# Patient Record
Sex: FEMALE | Race: BLACK OR AFRICAN AMERICAN | NOT HISPANIC OR LATINO | URBAN - METROPOLITAN AREA
[De-identification: names, ages, dates, MRNs, and addresses within clinical notes are randomized per-mention and may not be internally consistent; named-entity substitution may affect disease eponyms.]

---

## 2017-07-11 ENCOUNTER — EMERGENCY (EMERGENCY)
Facility: HOSPITAL | Age: 52
LOS: 1 days | Discharge: HOME | End: 2017-07-11
Admitting: EMERGENCY MEDICINE
Payer: COMMERCIAL

## 2017-07-11 ENCOUNTER — APPOINTMENT (OUTPATIENT)
Dept: UROLOGY | Facility: CLINIC | Age: 52
End: 2017-07-11

## 2017-07-11 VITALS
DIASTOLIC BLOOD PRESSURE: 103 MMHG | TEMPERATURE: 99 F | SYSTOLIC BLOOD PRESSURE: 156 MMHG | HEART RATE: 68 BPM | RESPIRATION RATE: 16 BRPM | OXYGEN SATURATION: 98 %

## 2017-07-11 VITALS
BODY MASS INDEX: 20.74 KG/M2 | DIASTOLIC BLOOD PRESSURE: 100 MMHG | WEIGHT: 126 LBS | RESPIRATION RATE: 17 BRPM | SYSTOLIC BLOOD PRESSURE: 173 MMHG | HEART RATE: 71 BPM | TEMPERATURE: 98.5 F | HEIGHT: 65.5 IN

## 2017-07-11 VITALS
OXYGEN SATURATION: 100 % | RESPIRATION RATE: 15 BRPM | SYSTOLIC BLOOD PRESSURE: 174 MMHG | HEART RATE: 74 BPM | TEMPERATURE: 98 F | DIASTOLIC BLOOD PRESSURE: 99 MMHG

## 2017-07-11 LAB
ALBUMIN SERPL ELPH-MCNC: 4.5 G/DL — SIGNIFICANT CHANGE UP (ref 3.3–5)
ALP SERPL-CCNC: 65 U/L — SIGNIFICANT CHANGE UP (ref 40–120)
ALT FLD-CCNC: 17 U/L — SIGNIFICANT CHANGE UP (ref 4–33)
APPEARANCE UR: CLEAR — SIGNIFICANT CHANGE UP
AST SERPL-CCNC: 20 U/L — SIGNIFICANT CHANGE UP (ref 4–32)
BASOPHILS # BLD AUTO: 0.04 K/UL — SIGNIFICANT CHANGE UP (ref 0–0.2)
BASOPHILS NFR BLD AUTO: 1.1 % — SIGNIFICANT CHANGE UP (ref 0–2)
BILIRUB SERPL-MCNC: 0.6 MG/DL — SIGNIFICANT CHANGE UP (ref 0.2–1.2)
BILIRUB UR-MCNC: NEGATIVE — SIGNIFICANT CHANGE UP
BLOOD UR QL VISUAL: HIGH
BUN SERPL-MCNC: 7 MG/DL — SIGNIFICANT CHANGE UP (ref 7–23)
CALCIUM SERPL-MCNC: 9.5 MG/DL — SIGNIFICANT CHANGE UP (ref 8.4–10.5)
CHLORIDE SERPL-SCNC: 104 MMOL/L — SIGNIFICANT CHANGE UP (ref 98–107)
CO2 SERPL-SCNC: 25 MMOL/L — SIGNIFICANT CHANGE UP (ref 22–31)
COLOR SPEC: YELLOW — SIGNIFICANT CHANGE UP
CREAT SERPL-MCNC: 0.81 MG/DL — SIGNIFICANT CHANGE UP (ref 0.5–1.3)
EOSINOPHIL # BLD AUTO: 0.04 K/UL — SIGNIFICANT CHANGE UP (ref 0–0.5)
EOSINOPHIL NFR BLD AUTO: 1.1 % — SIGNIFICANT CHANGE UP (ref 0–6)
GLUCOSE SERPL-MCNC: 86 MG/DL — SIGNIFICANT CHANGE UP (ref 70–99)
GLUCOSE UR-MCNC: NEGATIVE — SIGNIFICANT CHANGE UP
HCT VFR BLD CALC: 42.9 % — SIGNIFICANT CHANGE UP (ref 34.5–45)
HGB BLD-MCNC: 14.5 G/DL — SIGNIFICANT CHANGE UP (ref 11.5–15.5)
IMM GRANULOCYTES # BLD AUTO: 0 # — SIGNIFICANT CHANGE UP
IMM GRANULOCYTES NFR BLD AUTO: 0 % — SIGNIFICANT CHANGE UP (ref 0–1.5)
KETONES UR-MCNC: 15 — SIGNIFICANT CHANGE UP
LEUKOCYTE ESTERASE UR-ACNC: NEGATIVE — SIGNIFICANT CHANGE UP
LYMPHOCYTES # BLD AUTO: 1.88 K/UL — SIGNIFICANT CHANGE UP (ref 1–3.3)
LYMPHOCYTES # BLD AUTO: 52.4 % — HIGH (ref 13–44)
MCHC RBC-ENTMCNC: 30.5 PG — SIGNIFICANT CHANGE UP (ref 27–34)
MCHC RBC-ENTMCNC: 33.8 % — SIGNIFICANT CHANGE UP (ref 32–36)
MCV RBC AUTO: 90.3 FL — SIGNIFICANT CHANGE UP (ref 80–100)
MONOCYTES # BLD AUTO: 0.57 K/UL — SIGNIFICANT CHANGE UP (ref 0–0.9)
MONOCYTES NFR BLD AUTO: 15.9 % — HIGH (ref 2–14)
MUCOUS THREADS # UR AUTO: SIGNIFICANT CHANGE UP
NEUTROPHILS # BLD AUTO: 1.06 K/UL — LOW (ref 1.8–7.4)
NEUTROPHILS NFR BLD AUTO: 29.5 % — LOW (ref 43–77)
NITRITE UR-MCNC: NEGATIVE — SIGNIFICANT CHANGE UP
NRBC # FLD: 0 — SIGNIFICANT CHANGE UP
PH UR: 6 — SIGNIFICANT CHANGE UP (ref 5–8)
PLATELET # BLD AUTO: 131 K/UL — LOW (ref 150–400)
PMV BLD: 12.6 FL — SIGNIFICANT CHANGE UP (ref 7–13)
POTASSIUM SERPL-MCNC: 3.9 MMOL/L — SIGNIFICANT CHANGE UP (ref 3.5–5.3)
POTASSIUM SERPL-SCNC: 3.9 MMOL/L — SIGNIFICANT CHANGE UP (ref 3.5–5.3)
PROT SERPL-MCNC: 7.6 G/DL — SIGNIFICANT CHANGE UP (ref 6–8.3)
PROT UR-MCNC: NEGATIVE — SIGNIFICANT CHANGE UP
RBC # BLD: 4.75 M/UL — SIGNIFICANT CHANGE UP (ref 3.8–5.2)
RBC # FLD: 13 % — SIGNIFICANT CHANGE UP (ref 10.3–14.5)
RBC CASTS # UR COMP ASSIST: SIGNIFICANT CHANGE UP (ref 0–?)
SODIUM SERPL-SCNC: 144 MMOL/L — SIGNIFICANT CHANGE UP (ref 135–145)
SP GR SPEC: 1.01 — SIGNIFICANT CHANGE UP (ref 1–1.03)
UROBILINOGEN FLD QL: 0.2 E.U. — SIGNIFICANT CHANGE UP (ref 0.2–1)
WBC # BLD: 3.59 K/UL — LOW (ref 3.8–10.5)
WBC # FLD AUTO: 3.59 K/UL — LOW (ref 3.8–10.5)
WBC UR QL: SIGNIFICANT CHANGE UP (ref 0–?)

## 2017-07-11 PROCEDURE — 99285 EMERGENCY DEPT VISIT HI MDM: CPT

## 2017-07-11 NOTE — ED ADULT TRIAGE NOTE - CHIEF COMPLAINT QUOTE
Patient sent in by urologist  for elevated blood pressure. Seen by urologist for hematuria since January and now with possible vaginal spotting.  Denies pain, fevers, chills. PMH- htn

## 2017-07-11 NOTE — ED PROVIDER NOTE - OBJECTIVE STATEMENT
50 y/o F pt with PMhx of HTN and PSHx of , arrives to the ED c/o elevated BP and Hematuria since earlier today and 20 lb. weight loss over the last 3 months. Pt reports that she saw her Urologist (Dr. Hare) earlier today and was advised to come here due to elevated BP levels. Pt states that she was supposed to have a Cat-scan and US done but couldn't due to elevated BP and was advised to come to the ED for further evaluation. Denies HA, dizziness, SOB, CP, abd pain, LE edema, fever, chills, or any other complaints. Daily mdes: Amiloride (15mg) daily. Allergic to PCN. 50 y/o F pt with PMhx of HTN and PSHx of , arrives to the ED c/o elevated BP and Hematuria since earlier today and 20 lb. weight loss over the last 3 months. Pt reports that she saw her Urologist (Dr. Hare) earlier today and was advised to come here due to elevated BP levels. Pt states that she was supposed to have a CT scan and US done but couldn't due to elevated BP and was advised to come to the ED for further evaluation. Denies HA, dizziness, SOB, CP, abd pain, LE edema, fever, chills, or any other complaints. Daily meds: Amiloride (15mg) daily. Allergic to PCN.

## 2017-07-11 NOTE — ED PROVIDER NOTE - CARE PLAN
Principal Discharge DX:	HTN (hypertension)  Instructions for follow-up, activity and diet:	Continue taking enalapril as prescribed. Take norvasc 5mg once a day. Follow up with your PMD within 2 days and Dr. Hare. Return to ED for any worsening symptoms.

## 2017-07-11 NOTE — ED PROVIDER NOTE - MEDICAL DECISION MAKING DETAILS
52 y/o F pt with hematuria, elevated BP, a hx of HTN, sent here by her Urologist (Dr. Hare) due to high BP. Pt is well appearing and in no apparent distress. Check labs, UA, urine culture, and call Dr. Hare. Reassess.

## 2017-07-11 NOTE — ED PROVIDER NOTE - PLAN OF CARE
Continue taking enalapril as prescribed. Take norvasc 5mg once a day. Follow up with your PMD within 2 days and Dr. Hare. Return to ED for any worsening symptoms.

## 2017-07-11 NOTE — ED PROVIDER NOTE - PROGRESS NOTE DETAILS
ANNEL Mathews: spoke with urology resident who spoke with Dr. Hare who stated that sent patient to ED mainly for elevated BP and already has a special outpatient CT ordered for her. Pt's BP in ED is 156/103. ANNEL aMthews: spoke with urology resident who spoke with Dr. Hare who stated that sent patient to ED mainly for elevated BP and already has a "special" outpatient CT ordered for her. Pt's BP in ED is 156/103. Will dc home.

## 2017-07-12 ENCOUNTER — OTHER (OUTPATIENT)
Age: 52
End: 2017-07-12

## 2017-07-12 LAB — CORE LAB FLUID CYTOLOGY: NORMAL

## 2017-07-12 RX ORDER — AMLODIPINE BESYLATE 2.5 MG/1
1 TABLET ORAL
Qty: 30 | Refills: 0
Start: 2017-07-12 | End: 2017-08-11

## 2017-07-13 ENCOUNTER — FORM ENCOUNTER (OUTPATIENT)
Age: 52
End: 2017-07-13

## 2017-07-13 LAB
BACTERIA UR CULT: SIGNIFICANT CHANGE UP
SPECIMEN SOURCE: SIGNIFICANT CHANGE UP

## 2017-07-14 ENCOUNTER — APPOINTMENT (OUTPATIENT)
Dept: CT IMAGING | Facility: IMAGING CENTER | Age: 52
End: 2017-07-14

## 2017-07-14 ENCOUNTER — OUTPATIENT (OUTPATIENT)
Dept: OUTPATIENT SERVICES | Facility: HOSPITAL | Age: 52
LOS: 1 days | End: 2017-07-14
Payer: COMMERCIAL

## 2017-07-14 DIAGNOSIS — R31.0 GROSS HEMATURIA: ICD-10-CM

## 2017-07-14 PROCEDURE — 82565 ASSAY OF CREATININE: CPT

## 2017-07-14 PROCEDURE — 74178 CT ABD&PLV WO CNTR FLWD CNTR: CPT

## 2017-07-21 ENCOUNTER — OUTPATIENT (OUTPATIENT)
Dept: OUTPATIENT SERVICES | Facility: HOSPITAL | Age: 52
LOS: 1 days | End: 2017-07-21
Payer: COMMERCIAL

## 2017-07-21 ENCOUNTER — APPOINTMENT (OUTPATIENT)
Dept: UROLOGY | Facility: CLINIC | Age: 52
End: 2017-07-21

## 2017-07-21 VITALS — SYSTOLIC BLOOD PRESSURE: 163 MMHG | DIASTOLIC BLOOD PRESSURE: 94 MMHG | RESPIRATION RATE: 17 BRPM | HEART RATE: 66 BPM

## 2017-07-21 DIAGNOSIS — R31.0 GROSS HEMATURIA: ICD-10-CM

## 2017-07-21 PROCEDURE — 52000 CYSTOURETHROSCOPY: CPT

## 2017-07-24 DIAGNOSIS — R31.0 GROSS HEMATURIA: ICD-10-CM

## 2017-07-24 DIAGNOSIS — I10 ESSENTIAL (PRIMARY) HYPERTENSION: ICD-10-CM

## 2017-08-11 ENCOUNTER — APPOINTMENT (OUTPATIENT)
Dept: UROLOGY | Facility: CLINIC | Age: 52
End: 2017-08-11

## 2017-11-28 ENCOUNTER — APPOINTMENT (OUTPATIENT)
Dept: GASTROENTEROLOGY | Facility: CLINIC | Age: 52
End: 2017-11-28
Payer: COMMERCIAL

## 2017-11-28 VITALS
HEIGHT: 65.5 IN | TEMPERATURE: 98.8 F | SYSTOLIC BLOOD PRESSURE: 142 MMHG | WEIGHT: 133 LBS | BODY MASS INDEX: 21.89 KG/M2 | DIASTOLIC BLOOD PRESSURE: 86 MMHG

## 2017-11-28 DIAGNOSIS — Z12.11 ENCOUNTER FOR SCREENING FOR MALIGNANT NEOPLASM OF COLON: ICD-10-CM

## 2017-11-28 DIAGNOSIS — Z82.49 FAMILY HISTORY OF ISCHEMIC HEART DISEASE AND OTHER DISEASES OF THE CIRCULATORY SYSTEM: ICD-10-CM

## 2017-11-28 PROCEDURE — 99204 OFFICE O/P NEW MOD 45 MIN: CPT | Mod: 25

## 2017-11-28 PROCEDURE — 36415 COLL VENOUS BLD VENIPUNCTURE: CPT

## 2017-11-28 RX ORDER — FORMALDEHYDE 10 %
10 SOLUTION, NON-ORAL MISCELLANEOUS
Qty: 89 | Refills: 0 | Status: DISCONTINUED | COMMUNITY
Start: 2017-01-21 | End: 2017-11-28

## 2017-11-28 RX ORDER — CLOTRIMAZOLE AND BETAMETHASONE DIPROPIONATE 10; .5 MG/G; MG/G
1-0.05 CREAM TOPICAL
Qty: 45 | Refills: 0 | Status: DISCONTINUED | COMMUNITY
Start: 2017-06-29 | End: 2017-11-28

## 2017-11-29 LAB
APTT BLD: 29.2 SEC
INR PPP: 0.98 RATIO
PT BLD: 11.1 SEC

## 2017-12-05 ENCOUNTER — APPOINTMENT (OUTPATIENT)
Dept: GASTROENTEROLOGY | Facility: AMBULATORY MEDICAL SERVICES | Age: 52
End: 2017-12-05
Payer: COMMERCIAL

## 2017-12-05 PROCEDURE — 45385 COLONOSCOPY W/LESION REMOVAL: CPT

## 2017-12-14 LAB
BASOPHILS # BLD AUTO: 0.03 K/UL
BASOPHILS NFR BLD AUTO: 0.8 %
EOSINOPHIL # BLD AUTO: 0.07 K/UL
EOSINOPHIL NFR BLD AUTO: 2 %
HCT VFR BLD CALC: 42 %
HGB BLD-MCNC: 13.8 G/DL
IMM GRANULOCYTES NFR BLD AUTO: 0 %
LYMPHOCYTES # BLD AUTO: 1.68 K/UL
LYMPHOCYTES NFR BLD AUTO: 47.6 %
MAN DIFF?: NORMAL
MCHC RBC-ENTMCNC: 30.4 PG
MCHC RBC-ENTMCNC: 32.9 GM/DL
MCV RBC AUTO: 92.5 FL
MONOCYTES # BLD AUTO: 0.56 K/UL
MONOCYTES NFR BLD AUTO: 15.9 %
NEUTROPHILS # BLD AUTO: 1.19 K/UL
NEUTROPHILS NFR BLD AUTO: 33.7 %
PLATELET # BLD AUTO: 176 K/UL
RBC # BLD: 4.54 M/UL
RBC # FLD: 13.5 %
WBC # FLD AUTO: 3.53 K/UL

## 2017-12-19 ENCOUNTER — APPOINTMENT (OUTPATIENT)
Dept: GASTROENTEROLOGY | Facility: AMBULATORY MEDICAL SERVICES | Age: 52
End: 2017-12-19

## 2017-12-21 ENCOUNTER — APPOINTMENT (OUTPATIENT)
Dept: GASTROENTEROLOGY | Facility: CLINIC | Age: 52
End: 2017-12-21
Payer: COMMERCIAL

## 2017-12-21 VITALS
DIASTOLIC BLOOD PRESSURE: 92 MMHG | SYSTOLIC BLOOD PRESSURE: 140 MMHG | TEMPERATURE: 99 F | HEIGHT: 65.5 IN | BODY MASS INDEX: 21.73 KG/M2 | WEIGHT: 132 LBS

## 2017-12-21 PROCEDURE — 99213 OFFICE O/P EST LOW 20 MIN: CPT

## 2017-12-21 RX ORDER — SODIUM SULFATE, POTASSIUM SULFATE, MAGNESIUM SULFATE 17.5; 3.13; 1.6 G/ML; G/ML; G/ML
17.5-3.13-1.6 SOLUTION, CONCENTRATE ORAL
Qty: 1 | Refills: 0 | Status: DISCONTINUED | COMMUNITY
Start: 2017-11-28 | End: 2017-12-21

## 2018-05-29 ENCOUNTER — APPOINTMENT (OUTPATIENT)
Dept: GASTROENTEROLOGY | Facility: CLINIC | Age: 53
End: 2018-05-29
Payer: COMMERCIAL

## 2018-05-29 VITALS
HEIGHT: 65.5 IN | SYSTOLIC BLOOD PRESSURE: 120 MMHG | TEMPERATURE: 98.5 F | DIASTOLIC BLOOD PRESSURE: 100 MMHG | WEIGHT: 134 LBS | OXYGEN SATURATION: 95 % | HEART RATE: 68 BPM | BODY MASS INDEX: 22.06 KG/M2

## 2018-05-29 PROCEDURE — 82270 OCCULT BLOOD FECES: CPT

## 2018-05-29 PROCEDURE — 99214 OFFICE O/P EST MOD 30 MIN: CPT | Mod: 25

## 2018-06-07 ENCOUNTER — RESULT REVIEW (OUTPATIENT)
Age: 53
End: 2018-06-07

## 2018-07-10 ENCOUNTER — RESULT REVIEW (OUTPATIENT)
Age: 53
End: 2018-07-10

## 2018-07-16 PROBLEM — I10 ESSENTIAL (PRIMARY) HYPERTENSION: Chronic | Status: ACTIVE | Noted: 2017-07-27

## 2018-07-16 PROBLEM — I10 ESSENTIAL (PRIMARY) HYPERTENSION: Chronic | Status: INACTIVE | Noted: 2017-07-11 | Resolved: 2017-07-27

## 2018-07-19 ENCOUNTER — OUTPATIENT (OUTPATIENT)
Dept: OUTPATIENT SERVICES | Facility: HOSPITAL | Age: 53
LOS: 1 days | End: 2018-07-19
Payer: COMMERCIAL

## 2018-07-19 ENCOUNTER — APPOINTMENT (OUTPATIENT)
Dept: MAMMOGRAPHY | Facility: CLINIC | Age: 53
End: 2018-07-19
Payer: COMMERCIAL

## 2018-07-19 DIAGNOSIS — Z12.31 ENCOUNTER FOR SCREENING MAMMOGRAM FOR MALIGNANT NEOPLASM OF BREAST: ICD-10-CM

## 2018-07-19 PROCEDURE — 77063 BREAST TOMOSYNTHESIS BI: CPT

## 2018-07-19 PROCEDURE — 77067 SCR MAMMO BI INCL CAD: CPT

## 2018-07-19 PROCEDURE — 77063 BREAST TOMOSYNTHESIS BI: CPT | Mod: 26

## 2018-07-19 PROCEDURE — 77067 SCR MAMMO BI INCL CAD: CPT | Mod: 26

## 2018-08-23 ENCOUNTER — RX RENEWAL (OUTPATIENT)
Age: 53
End: 2018-08-23

## 2018-08-28 ENCOUNTER — RX RENEWAL (OUTPATIENT)
Age: 53
End: 2018-08-28

## 2018-09-27 ENCOUNTER — RECORD ABSTRACTING (OUTPATIENT)
Age: 53
End: 2018-09-27

## 2018-09-27 DIAGNOSIS — Z80.3 FAMILY HISTORY OF MALIGNANT NEOPLASM OF BREAST: ICD-10-CM

## 2018-09-27 DIAGNOSIS — Z86.018 PERSONAL HISTORY OF OTHER BENIGN NEOPLASM: ICD-10-CM

## 2018-09-27 DIAGNOSIS — Z82.5 FAMILY HISTORY OF ASTHMA AND OTHER CHRONIC LOWER RESPIRATORY DISEASES: ICD-10-CM

## 2018-09-27 DIAGNOSIS — L60.3 NAIL DYSTROPHY: ICD-10-CM

## 2018-09-27 DIAGNOSIS — Z83.3 FAMILY HISTORY OF DIABETES MELLITUS: ICD-10-CM

## 2018-11-07 ENCOUNTER — APPOINTMENT (OUTPATIENT)
Dept: INTERNAL MEDICINE | Facility: CLINIC | Age: 53
End: 2018-11-07
Payer: COMMERCIAL

## 2018-11-07 ENCOUNTER — NON-APPOINTMENT (OUTPATIENT)
Age: 53
End: 2018-11-07

## 2018-11-07 ENCOUNTER — TRANSCRIPTION ENCOUNTER (OUTPATIENT)
Age: 53
End: 2018-11-07

## 2018-11-07 VITALS
DIASTOLIC BLOOD PRESSURE: 84 MMHG | HEIGHT: 65.5 IN | TEMPERATURE: 98 F | WEIGHT: 137 LBS | SYSTOLIC BLOOD PRESSURE: 132 MMHG | BODY MASS INDEX: 22.55 KG/M2

## 2018-11-07 DIAGNOSIS — Z12.11 ENCOUNTER FOR SCREENING FOR MALIGNANT NEOPLASM OF COLON: ICD-10-CM

## 2018-11-07 DIAGNOSIS — D36.9 BENIGN NEOPLASM, UNSPECIFIED SITE: ICD-10-CM

## 2018-11-07 DIAGNOSIS — Z11.59 ENCOUNTER FOR SCREENING FOR OTHER VIRAL DISEASES: ICD-10-CM

## 2018-11-07 PROCEDURE — 36415 COLL VENOUS BLD VENIPUNCTURE: CPT

## 2018-11-07 PROCEDURE — 99396 PREV VISIT EST AGE 40-64: CPT | Mod: 25

## 2018-11-07 PROCEDURE — 93000 ELECTROCARDIOGRAM COMPLETE: CPT

## 2018-11-07 NOTE — PLAN
[FreeTextEntry1] : Check sugar cholesterol and hepatitis C today.\par Hypertension controlled.\par Return to office in 6 months.

## 2018-11-07 NOTE — HISTORY OF PRESENT ILLNESS
[FreeTextEntry1] : physical [de-identified] : Declines flu shot.  Wants tdap as daughter just had a baby.\par Worries a lot.  \par No exercise but active at work.\par Has asymmetry of left chest wall below breast that her previous doctor imaged.

## 2018-11-07 NOTE — HEALTH RISK ASSESSMENT
[0] : 2) Feeling down, depressed, or hopeless: Not at all (0) [HIV test declined] : HIV test declined [Patient reported mammogram was normal] : Patient reported mammogram was normal [Patient reported PAP Smear was normal] : Patient reported PAP Smear was normal [[id number]] : 0 [MammogramDate] : 07/18 [PapSmearDate] : 07/18

## 2018-11-11 ENCOUNTER — TRANSCRIPTION ENCOUNTER (OUTPATIENT)
Age: 53
End: 2018-11-11

## 2018-11-11 LAB
25(OH)D3 SERPL-MCNC: 20.3 NG/ML
ALBUMIN SERPL ELPH-MCNC: 4.4 G/DL
ALP BLD-CCNC: 82 U/L
ALT SERPL-CCNC: 26 U/L
ANION GAP SERPL CALC-SCNC: 12 MMOL/L
APPEARANCE: CLEAR
AST SERPL-CCNC: 26 U/L
BACTERIA: NEGATIVE
BASOPHILS # BLD AUTO: 0.03 K/UL
BASOPHILS NFR BLD AUTO: 1.1 %
BILIRUB SERPL-MCNC: 0.4 MG/DL
BILIRUBIN URINE: NEGATIVE
BLOOD URINE: NEGATIVE
BUN SERPL-MCNC: 12 MG/DL
CALCIUM SERPL-MCNC: 9.6 MG/DL
CHLORIDE SERPL-SCNC: 105 MMOL/L
CHOLEST SERPL-MCNC: 168 MG/DL
CHOLEST/HDLC SERPL: 1.8 RATIO
CO2 SERPL-SCNC: 25 MMOL/L
COLOR: YELLOW
CREAT SERPL-MCNC: 0.75 MG/DL
EOSINOPHIL # BLD AUTO: 0.07 K/UL
EOSINOPHIL NFR BLD AUTO: 2.5 %
GLUCOSE QUALITATIVE U: NEGATIVE MG/DL
GLUCOSE SERPL-MCNC: 79 MG/DL
HBA1C MFR BLD HPLC: 5.6 %
HCT VFR BLD CALC: 43.7 %
HCV AB SER QL: NONREACTIVE
HCV S/CO RATIO: 0.69 S/CO
HDLC SERPL-MCNC: 94 MG/DL
HGB BLD-MCNC: 14.4 G/DL
HYALINE CASTS: 1 /LPF
IMM GRANULOCYTES NFR BLD AUTO: 0 %
KETONES URINE: NEGATIVE
LDLC SERPL CALC-MCNC: 66 MG/DL
LEUKOCYTE ESTERASE URINE: NEGATIVE
LYMPHOCYTES # BLD AUTO: 1.18 K/UL
LYMPHOCYTES NFR BLD AUTO: 41.7 %
MAN DIFF?: NORMAL
MCHC RBC-ENTMCNC: 30.8 PG
MCHC RBC-ENTMCNC: 33 GM/DL
MCV RBC AUTO: 93.6 FL
MICROSCOPIC-UA: NORMAL
MONOCYTES # BLD AUTO: 0.4 K/UL
MONOCYTES NFR BLD AUTO: 14.1 %
NEUTROPHILS # BLD AUTO: 1.15 K/UL
NEUTROPHILS NFR BLD AUTO: 40.6 %
NITRITE URINE: NEGATIVE
PH URINE: 7
PLATELET # BLD AUTO: 148 K/UL
POTASSIUM SERPL-SCNC: 4.3 MMOL/L
PROT SERPL-MCNC: 7.7 G/DL
PROTEIN URINE: NEGATIVE MG/DL
RBC # BLD: 4.67 M/UL
RBC # FLD: 13.9 %
RED BLOOD CELLS URINE: 3 /HPF
SODIUM SERPL-SCNC: 142 MMOL/L
SPECIFIC GRAVITY URINE: 1.02
SQUAMOUS EPITHELIAL CELLS: 0 /HPF
TRIGL SERPL-MCNC: 41 MG/DL
TSH SERPL-ACNC: 2.06 UIU/ML
UROBILINOGEN URINE: NEGATIVE MG/DL
VIT B12 SERPL-MCNC: 840 PG/ML
WBC # FLD AUTO: 2.83 K/UL
WHITE BLOOD CELLS URINE: 0 /HPF

## 2018-11-29 ENCOUNTER — RESULT REVIEW (OUTPATIENT)
Age: 53
End: 2018-11-29

## 2018-12-13 ENCOUNTER — TRANSCRIPTION ENCOUNTER (OUTPATIENT)
Age: 53
End: 2018-12-13

## 2018-12-13 LAB — HEMOCCULT STL QL IA: NEGATIVE

## 2018-12-27 ENCOUNTER — RESULT REVIEW (OUTPATIENT)
Age: 53
End: 2018-12-27

## 2019-01-23 ENCOUNTER — APPOINTMENT (OUTPATIENT)
Dept: INTERNAL MEDICINE | Facility: CLINIC | Age: 54
End: 2019-01-23
Payer: COMMERCIAL

## 2019-01-23 ENCOUNTER — CHART COPY (OUTPATIENT)
Age: 54
End: 2019-01-23

## 2019-01-23 VITALS
BODY MASS INDEX: 22.55 KG/M2 | TEMPERATURE: 97.9 F | HEIGHT: 65.5 IN | SYSTOLIC BLOOD PRESSURE: 132 MMHG | WEIGHT: 137 LBS | DIASTOLIC BLOOD PRESSURE: 90 MMHG

## 2019-01-23 DIAGNOSIS — G44.53 PRIMARY THUNDERCLAP HEADACHE: ICD-10-CM

## 2019-01-23 PROCEDURE — 99215 OFFICE O/P EST HI 40 MIN: CPT

## 2019-01-23 RX ORDER — MISOPROSTOL 200 UG/1
200 TABLET ORAL
Qty: 1 | Refills: 0 | Status: DISCONTINUED | COMMUNITY
Start: 2018-12-24

## 2019-01-23 RX ORDER — CLARITHROMYCIN 500 MG/1
500 TABLET, FILM COATED ORAL
Qty: 20 | Refills: 0 | Status: DISCONTINUED | COMMUNITY
Start: 2018-12-18

## 2019-01-23 NOTE — HISTORY OF PRESENT ILLNESS
[FreeTextEntry8] : Pt had a HERNANDEZ suddenly today that was worse with bending over.  She describes it as the worst HA of her life. Checked her BP and it was 176/114.  Then it went down to 150 systolic.  Her daughter is a pharmacist and wants her to stop enalapril and start amlodipine.\par Had a sinus infection 1 month ago and went to ENT.  They told her to do nasal spray and gave abx, no steroids.  She still has some congestion symptoms and a dry cough.

## 2019-01-23 NOTE — PLAN
[FreeTextEntry1] : She will go for a stat CT head to rule out an intracranial bleed. Her neurologic exam is normal.\par Her blood pressure is normal today. As per her daughter's request we will stop the enalapril and start amlodipine. I believe 10 mg should be an adequate dose for her. She will return to office in 2 weeks for another blood pressure check.\par She will f/u with ENT if sinus symptoms do not resolve.\par

## 2019-01-23 NOTE — PHYSICAL EXAM
[No Acute Distress] : no acute distress [Normal Sclera/Conjunctiva] : normal sclera/conjunctiva [Normal Outer Ear/Nose] : the outer ears and nose were normal in appearance [Supple] : supple [No Respiratory Distress] : no respiratory distress  [Clear to Auscultation] : lungs were clear to auscultation bilaterally [Normal Rate] : normal rate  [Regular Rhythm] : with a regular rhythm [Normal Posterior Cervical Nodes] : no posterior cervical lymphadenopathy [Normal Anterior Cervical Nodes] : no anterior cervical lymphadenopathy [Grossly Normal Strength/Tone] : grossly normal strength/tone [No Rash] : no rash [Normal Gait] : normal gait [Coordination Grossly Intact] : coordination grossly intact [No Focal Deficits] : no focal deficits [Deep Tendon Reflexes (DTR)] : deep tendon reflexes were 2+ and symmetric [Normal Affect] : the affect was normal [Kyphosis] : no kyphosis [de-identified] : cn 2-12 intact

## 2019-01-31 ENCOUNTER — OTHER (OUTPATIENT)
Age: 54
End: 2019-01-31

## 2019-02-05 ENCOUNTER — APPOINTMENT (OUTPATIENT)
Dept: INTERNAL MEDICINE | Facility: CLINIC | Age: 54
End: 2019-02-05

## 2019-06-27 ENCOUNTER — RESULT REVIEW (OUTPATIENT)
Age: 54
End: 2019-06-27

## 2019-08-27 ENCOUNTER — RX RENEWAL (OUTPATIENT)
Age: 54
End: 2019-08-27

## 2019-10-27 ENCOUNTER — EMERGENCY (EMERGENCY)
Facility: HOSPITAL | Age: 54
LOS: 1 days | Discharge: HOME | End: 2019-10-27
Attending: EMERGENCY MEDICINE | Admitting: EMERGENCY MEDICINE
Payer: COMMERCIAL

## 2019-10-27 VITALS
SYSTOLIC BLOOD PRESSURE: 176 MMHG | OXYGEN SATURATION: 100 % | TEMPERATURE: 98 F | HEART RATE: 84 BPM | RESPIRATION RATE: 16 BRPM | DIASTOLIC BLOOD PRESSURE: 109 MMHG

## 2019-10-27 LAB — OB PNL STL: NEGATIVE — SIGNIFICANT CHANGE UP

## 2019-10-27 PROCEDURE — 99283 EMERGENCY DEPT VISIT LOW MDM: CPT

## 2019-10-27 NOTE — ED PROVIDER NOTE - PHYSICAL EXAMINATION
chaperoned by GAVIN Cam  rectal: no fissure, no external hemorrhoids  no internal hemorrhoids palpated; brown stool; no bleeding; no abscess

## 2019-10-27 NOTE — ED PROVIDER NOTE - NSFOLLOWUPINSTRUCTIONS_ED_ALL_ED_FT
Follow up with your GI doctor as scheduled  Return to ED for rectal bleeding, worsening pain, fever, or any other complaints in which you feel you may require immediate care.

## 2019-10-27 NOTE — ED PROVIDER NOTE - OBJECTIVE STATEMENT
53 yr old female with no sig PMH, last colonoscopy showing polyps and internal hemorrhoids presents with BRBPR.  States was feeling some rectal pain and felt maybe this was hemorrhoids.  Started warm baths, lavendar oil, etc.  Today after having a normal bowel movement went to shower before [employer] and felt a gush of blood come out her rectum.  Applied a pad.  Not actively bleeding.  has GI appt on November 7th.  Denies abdominal pain, fever, urinary symptoms.

## 2019-10-27 NOTE — ED ADULT TRIAGE NOTE - CHIEF COMPLAINT QUOTE
Pt c/o 1 week of rectal pain, has history of hemorrhoids and polyps, used hemorrhoid suppositories and started feeling better. Pt states this morning she had copious BRBPR. Pt denies abd pain, reports continued rectal pain. PMH of HTN, took her meds prior to arrival.

## 2019-10-27 NOTE — ED PROVIDER NOTE - PATIENT PORTAL LINK FT
You can access the FollowMyHealth Patient Portal offered by [facility] by registering at the following website: [email or web address]. By joining [facility]’s FollowMyHealth portal, you will also be able to view your health information using other applications (apps) compatible with our system.

## 2019-10-27 NOTE — ED PROVIDER NOTE - CLINICAL SUMMARY MEDICAL DECISION MAKING FREE TEXT BOX
VSS: brown stool; not actively bleeding; has appropriate follow up; no pain on ADAM; will send hemoccult; will d/c

## 2019-10-28 ENCOUNTER — TRANSCRIPTION ENCOUNTER (OUTPATIENT)
Age: 54
End: 2019-10-28

## 2019-10-31 ENCOUNTER — APPOINTMENT (OUTPATIENT)
Dept: GASTROENTEROLOGY | Facility: CLINIC | Age: 54
End: 2019-10-31
Payer: COMMERCIAL

## 2019-10-31 VITALS
HEART RATE: 72 BPM | DIASTOLIC BLOOD PRESSURE: 90 MMHG | TEMPERATURE: 98.4 F | SYSTOLIC BLOOD PRESSURE: 140 MMHG | OXYGEN SATURATION: 95 %

## 2019-10-31 PROCEDURE — 82270 OCCULT BLOOD FECES: CPT

## 2019-10-31 PROCEDURE — 99214 OFFICE O/P EST MOD 30 MIN: CPT | Mod: 25

## 2019-11-01 NOTE — PHYSICAL EXAM
[General Appearance - Alert] : alert [General Appearance - In No Acute Distress] : in no acute distress [Neck Appearance] : the appearance of the neck was normal [Neck Cervical Mass (___cm)] : no neck mass was observed [Jugular Venous Distention Increased] : there was no jugular-venous distention [Thyroid Diffuse Enlargement] : the thyroid was not enlarged [Thyroid Nodule] : there were no palpable thyroid nodules [Auscultation Breath Sounds / Voice Sounds] : lungs were clear to auscultation bilaterally [Heart Rate And Rhythm] : heart rate was normal and rhythm regular [Heart Sounds] : normal S1 and S2 [Heart Sounds Gallop] : no gallops [Murmurs] : no murmurs [Heart Sounds Pericardial Friction Rub] : no pericardial rub [Edema] : there was no peripheral edema [Bowel Sounds] : normal bowel sounds [Abdomen Soft] : soft [] : no hepato-splenomegaly [Abdomen Mass (___ Cm)] : no abdominal mass palpated [Normal Sphincter Tone] : normal sphincter tone [No Rectal Mass] : no rectal mass [External Hemorrhoid] : external hemorrhoids [No CVA Tenderness] : no ~M costovertebral angle tenderness [No Spinal Tenderness] : no spinal tenderness [Oriented To Time, Place, And Person] : oriented to person, place, and time [Impaired Insight] : insight and judgment were intact [Affect] : the affect was normal [FreeTextEntry1] : mild epigastric tenderness [Occult Blood Positive] : stool was negative for occult blood

## 2019-11-01 NOTE — ASSESSMENT
[FreeTextEntry1] : Patient with rectal bleeding along with rectal pain, burning, and itching.  She is currently guaiac negative and has small external hemorrhoids.  It appears that her symptoms are recurring due to her internal hemorrhoids.  She does have a history of colonic polyps.\par \par Patient was given hydrocortisone suppositories 25 mg to take twice daily for 7 days and then as needed.\par \par Patient will return to see me if she has continued rectal bleeding, in which case we would do a colonoscopy now.  Otherwise, the patient is due for a colonoscopy in December 2020.\par \par \par Plan from 5/29/2018 - Patient with 2 days of self-limited diarrhea with blood one week ago. The bleeding has resolved. The patient is currently guaiac-negative. She has hemorrhoids which likely caused the bleeding.\par \par The patient was given Proctosol HC to Naprosyn cream to use as needed for her hemorrhoids.\par \par The patient was reassured. She was advised to call if any further bleeding develops.\par \par Patient is due for colonoscopy in December 2020 for a history of polyps.\par \par \par Plan from 12/21/17 - Patient with an adenomatous polyp. She has hemorrhoids which are asymptomatic. She has had no further rectal bleeding.\par \par We will repeat a colonoscopy in 3 years this December 2020.\par \par Advised patient to increase fiber and water in her diet to prevent symptomatic hemorrhoids.\par \par \par Plan from 11/28/17 - Patient had one-week of rectal bleeding. This may have been related to her external hemorrhoid but other etiologies need to be considered. Additionally, the patient requires an initial screening colonoscopy. The patient has a history of gross hematuria and admits to easy bruising. A bleeding disorder needs to be considered.\par \par Blood work was sent for CBC, PT, PTT.\par \par Assuming the above labs are normal, a colonoscopy has been scheduled. The risks, benefits, alternatives, and limitations of the procedure, including the possibility of missed lesions, were explained.

## 2019-11-01 NOTE — HISTORY OF PRESENT ILLNESS
[FreeTextEntry1] : The patient reports that 3 weeks ago she held her stool in which made her bowel movements hard and she developed burning/itching at the rectum.  She used an over-the-counter hemorrhoidal suppository and did warm soaks with some relief on Sunday morning while in the shower the patient started having blood coming from her rectum dripping down her leg.  She went to the [facility] emergency room where she was guaiac negative and sent home.  She notes that she had increased frequency of bowel movements but these have since slowed down.  She is now having 2 solid bowel movements a day without melena.  She denies abdominal pain, heartburn, dysphasia.  She continues to complain of rectal pain, burning, and itching, mostly internally.  She has known internal and external hemorrhoids.\par \par \par Note from 5/29/2018 - The patient states that she had a sinus infection with a runny nose. She took prednisone for 2 days and then developed diarrhea. She had 2 days of multiple liquid bowel movements with blood in it, which occurred one week ago. Since then, the patient has had one solid bowel movement a day without any further bleeding. There is no melena. She denies abdominal pain until today which she feels is related to eating spoiled food last evening. She denies rectal pain, burning, or itching. Her last colonoscopy was in December and revealed an adenomatous polyp and internal and external hemorrhoids.\par \par \par Note from 12/21/17 - The patient is status post colonoscopy performed on December 5, 2017. A 1.5 cm polyp was removed from the proximal transverse colon and was a tubular adenoma. The patient also has internal and external hemorrhoids which are asymptomatic. The patient feels well denying abdominal pain, diarrhea, constipation, melena, and bright red blood per rectum. We reviewed the blood work from the previous visit of November 28 which was normal.\par \par \par Note from 11/28/17 - The patient is a 52-year-old woman presents with rectal bleeding. 4 weeks ago, the patient had congestion and coughing. For a week, she had soft stools with blood mixed with brown stool. The water turned red. She denies melena. She has had no further bleeding and now has one solid bowel movement a day. She denies abdominal pain, heartburn, or dysphagia. She denies rectal pain, burning, or itching. Her weight is stable. She has never had a colonoscopy. The patient has not been hospitalized in the past year and denies any cardiac issues.\par \par Of note, the patient has a history of gross hematuria which was unexplained. She also admits to easy bruising.

## 2019-11-01 NOTE — CONSULT LETTER
[FreeTextEntry1] : Dear Dr. Zehra Greenwood,\par \par I had the pleasure of seeing your patient JU MARTINEZ in the office today.  My office note is attached.\par \par Thank you very much for allowing me to participate in the care of your patient.\par \par Sincerely,\par \par Adán Flores M.D., FACG, FACP\par Director, Celiac Program at [facility]\par  of Medicine\par [place] [employer] at [facility] Practice Director,\par [facility] Gastroenterology/Internal Medicine at [place]\par [address] - [address]\[place] [address]\par Tel: [phone]\par Email: [email or web address]\par \par \par The attached note has been created using a voice recognition system (Dragon).  There may be some misspellings and typos.  Please call my office if you have any issues or questions. \par

## 2019-11-22 ENCOUNTER — RX RENEWAL (OUTPATIENT)
Age: 54
End: 2019-11-22

## 2019-12-16 ENCOUNTER — RX RENEWAL (OUTPATIENT)
Age: 54
End: 2019-12-16

## 2019-12-16 ENCOUNTER — RX CHANGE (OUTPATIENT)
Age: 54
End: 2019-12-16

## 2019-12-19 ENCOUNTER — APPOINTMENT (OUTPATIENT)
Dept: INTERNAL MEDICINE | Facility: CLINIC | Age: 54
End: 2019-12-19

## 2020-02-11 ENCOUNTER — APPOINTMENT (OUTPATIENT)
Dept: INTERNAL MEDICINE | Facility: CLINIC | Age: 55
End: 2020-02-11
Payer: COMMERCIAL

## 2020-02-11 VITALS
BODY MASS INDEX: 22.72 KG/M2 | TEMPERATURE: 97 F | SYSTOLIC BLOOD PRESSURE: 150 MMHG | WEIGHT: 138 LBS | DIASTOLIC BLOOD PRESSURE: 90 MMHG | HEIGHT: 65.5 IN

## 2020-02-11 PROCEDURE — 99214 OFFICE O/P EST MOD 30 MIN: CPT

## 2020-02-11 RX ORDER — CIPROFLOXACIN 3 MG/ML
0.3 SOLUTION OPHTHALMIC
Qty: 1 | Refills: 1 | Status: DISCONTINUED | COMMUNITY
Start: 2019-06-18 | End: 2020-02-11

## 2020-02-11 RX ORDER — AMLODIPINE BESYLATE 10 MG/1
10 TABLET ORAL DAILY
Qty: 90 | Refills: 3 | Status: DISCONTINUED | COMMUNITY
Start: 2019-01-23 | End: 2020-02-11

## 2020-02-11 NOTE — REVIEW OF SYSTEMS
[Earache] : no earache [Nasal Discharge] : no nasal discharge [Sore Throat] : no sore throat [Shortness Of Breath] : no shortness of breath [Cough] : cough [Joint Pain] : no joint pain [Back Pain] : back pain [Negative] : Neurological [FreeTextEntry4] : as noted in HPI

## 2020-02-11 NOTE — PLAN
[FreeTextEntry1] : Depression screening negative\par Will give naproxen and a muscle relaxant. Can continue to apply heat.\par She is to followup with her hypertension specialist but her blood pressure\par Return for physical

## 2020-02-11 NOTE — HISTORY OF PRESENT ILLNESS
[FreeTextEntry8] : 54-year-old female with history of hypertension who complains of having sinus symptoms for a few days. She has been using a netti pot. She is coughing and sneezing. No fevers or chills. Yesterday she developed right low back pain. She put a heating pad on it.\par \par She has been on enalapril for her blood pressure. Her daughter, who is a pharmacist, requested that she be changed to amlodipine. She states that she had a bad reaction to amlodipine so she didn't take it. She went to see a hypertension specialist and will go back soon. He kept her on enalapril.

## 2020-02-25 ENCOUNTER — APPOINTMENT (OUTPATIENT)
Dept: INTERNAL MEDICINE | Facility: CLINIC | Age: 55
End: 2020-02-25

## 2020-05-06 ENCOUNTER — RX RENEWAL (OUTPATIENT)
Age: 55
End: 2020-05-06

## 2020-08-25 ENCOUNTER — RESULT REVIEW (OUTPATIENT)
Age: 55
End: 2020-08-25

## 2020-12-12 ENCOUNTER — OUTPATIENT (OUTPATIENT)
Dept: OUTPATIENT SERVICES | Facility: HOSPITAL | Age: 55
LOS: 1 days | End: 2020-12-12
Payer: COMMERCIAL

## 2020-12-12 ENCOUNTER — APPOINTMENT (OUTPATIENT)
Dept: MAMMOGRAPHY | Facility: CLINIC | Age: 55
End: 2020-12-12
Payer: COMMERCIAL

## 2020-12-12 DIAGNOSIS — Z00.8 ENCOUNTER FOR OTHER GENERAL EXAMINATION: ICD-10-CM

## 2020-12-12 PROCEDURE — 77067 SCR MAMMO BI INCL CAD: CPT | Mod: 26

## 2020-12-12 PROCEDURE — 77067 SCR MAMMO BI INCL CAD: CPT

## 2020-12-12 PROCEDURE — 77063 BREAST TOMOSYNTHESIS BI: CPT

## 2020-12-12 PROCEDURE — 77063 BREAST TOMOSYNTHESIS BI: CPT | Mod: 26

## 2020-12-15 PROBLEM — Z12.11 ENCOUNTER FOR SCREENING FOR MALIGNANT NEOPLASM OF COLON: Status: RESOLVED | Noted: 2017-11-29 | Resolved: 2020-12-15

## 2020-12-21 ENCOUNTER — APPOINTMENT (OUTPATIENT)
Dept: GASTROENTEROLOGY | Facility: CLINIC | Age: 55
End: 2020-12-21
Payer: COMMERCIAL

## 2020-12-21 VITALS
OXYGEN SATURATION: 96 % | DIASTOLIC BLOOD PRESSURE: 90 MMHG | HEART RATE: 89 BPM | WEIGHT: 131 LBS | BODY MASS INDEX: 21.56 KG/M2 | TEMPERATURE: 98.2 F | HEIGHT: 65.5 IN | SYSTOLIC BLOOD PRESSURE: 150 MMHG

## 2020-12-21 DIAGNOSIS — D12.3 BENIGN NEOPLASM OF TRANSVERSE COLON: ICD-10-CM

## 2020-12-21 DIAGNOSIS — Z01.818 ENCOUNTER FOR OTHER PREPROCEDURAL EXAMINATION: ICD-10-CM

## 2020-12-21 PROCEDURE — 99072 ADDL SUPL MATRL&STAF TM PHE: CPT

## 2020-12-21 PROCEDURE — 99213 OFFICE O/P EST LOW 20 MIN: CPT

## 2020-12-21 RX ORDER — NAPROXEN 500 MG/1
500 TABLET ORAL
Qty: 60 | Refills: 1 | Status: DISCONTINUED | COMMUNITY
Start: 2020-02-11 | End: 2020-12-21

## 2020-12-21 RX ORDER — ENALAPRIL MALEATE 5 MG/1
5 TABLET ORAL
Qty: 120 | Refills: 0 | Status: DISCONTINUED | COMMUNITY
Start: 2017-05-31 | End: 2020-12-21

## 2020-12-21 RX ORDER — TIZANIDINE 4 MG/1
4 TABLET ORAL
Qty: 60 | Refills: 0 | Status: DISCONTINUED | COMMUNITY
Start: 2020-02-11 | End: 2020-12-21

## 2020-12-21 NOTE — PHYSICAL EXAM
[General Appearance - Alert] : alert [General Appearance - In No Acute Distress] : in no acute distress [Neck Appearance] : the appearance of the neck was normal [Neck Cervical Mass (___cm)] : no neck mass was observed [Jugular Venous Distention Increased] : there was no jugular-venous distention [Thyroid Diffuse Enlargement] : the thyroid was not enlarged [Thyroid Nodule] : there were no palpable thyroid nodules [Auscultation Breath Sounds / Voice Sounds] : lungs were clear to auscultation bilaterally [Heart Rate And Rhythm] : heart rate was normal and rhythm regular [Heart Sounds] : normal S1 and S2 [Heart Sounds Gallop] : no gallops [Murmurs] : no murmurs [Heart Sounds Pericardial Friction Rub] : no pericardial rub [Edema] : there was no peripheral edema [Bowel Sounds] : normal bowel sounds [Abdomen Soft] : soft [] : no hepato-splenomegaly [Abdomen Mass (___ Cm)] : no abdominal mass palpated [No CVA Tenderness] : no ~M costovertebral angle tenderness [No Spinal Tenderness] : no spinal tenderness [Oriented To Time, Place, And Person] : oriented to person, place, and time [Impaired Insight] : insight and judgment were intact [Affect] : the affect was normal [FreeTextEntry1] : mild epigastric tenderness

## 2020-12-21 NOTE — ASSESSMENT
[FreeTextEntry1] : Patient with history of adenomatous colonic polyps.  She had one episode of mild rectal bleeding relieved by hemorrhoidal treatments.\par \par A colonoscopy has been scheduled. The risks, benefits, alternatives, and limitations of the procedure, including the possibility of missed lesions, were explained.\par \par \par Plan from 10/31/2019 - Patient with rectal bleeding along with rectal pain, burning, and itching.  She is currently guaiac negative and has small external hemorrhoids.  It appears that her symptoms are recurring due to her internal hemorrhoids.  She does have a history of colonic polyps.\par \par Patient was given hydrocortisone suppositories 25 mg to take twice daily for 7 days and then as needed.\par \par Patient will return to see me if she has continued rectal bleeding, in which case we would do a colonoscopy now.  Otherwise, the patient is due for a colonoscopy in December 2020.\par \par \par Plan from 5/29/2018 - Patient with 2 days of self-limited diarrhea with blood one week ago. The bleeding has resolved. The patient is currently guaiac-negative. She has hemorrhoids which likely caused the bleeding.\par \par The patient was given Proctosol HC to Naprosyn cream to use as needed for her hemorrhoids.\par \par The patient was reassured. She was advised to call if any further bleeding develops.\par \par Patient is due for colonoscopy in December 2020 for a history of polyps.\par \par \par Plan from 12/21/17 - Patient with an adenomatous polyp. She has hemorrhoids which are asymptomatic. She has had no further rectal bleeding.\par \par We will repeat a colonoscopy in 3 years this December 2020.\par \par Advised patient to increase fiber and water in her diet to prevent symptomatic hemorrhoids.\par \par \par Plan from 11/28/17 - Patient had one-week of rectal bleeding. This may have been related to her external hemorrhoid but other etiologies need to be considered. Additionally, the patient requires an initial screening colonoscopy. The patient has a history of gross hematuria and admits to easy bruising. A bleeding disorder needs to be considered.\par \par Blood work was sent for CBC, PT, PTT.\par \par Assuming the above labs are normal, a colonoscopy has been scheduled. The risks, benefits, alternatives, and limitations of the procedure, including the possibility of missed lesions, were explained.

## 2020-12-21 NOTE — HISTORY OF PRESENT ILLNESS
[FreeTextEntry1] : The patient has a history of adenomatous colonic polyps.  She has been feeling well denying abdominal pain.  She has 1 solid bowel movement a day without melena.  She did see bright red blood on the toilet paper in October.  She used the suppositories and cream for 1 week and the bleeding resolved.  She denies heartburn or dysphagia.  The patient's weight is stable.  The patient has not been admitted to the hospital in the past year and denies any cardiac issues.\par \par \par Note from 10/31/2019 - The patient reports that 3 weeks ago she held her stool in which made her bowel movements hard and she developed burning/itching at the rectum.  She used an over-the-counter hemorrhoidal suppository and did warm soaks with some relief on Sunday morning while in the shower the patient started having blood coming from her rectum dripping down her leg.  She went to the [facility] emergency room where she was guaiac negative and sent home.  She notes that she had increased frequency of bowel movements but these have since slowed down.  She is now having 2 solid bowel movements a day without melena.  She denies abdominal pain, heartburn, dysphasia.  She continues to complain of rectal pain, burning, and itching, mostly internally.  She has known internal and external hemorrhoids.\par \par \par Note from 5/29/2018 - The patient states that she had a sinus infection with a runny nose. She took prednisone for 2 days and then developed diarrhea. She had 2 days of multiple liquid bowel movements with blood in it, which occurred one week ago. Since then, the patient has had one solid bowel movement a day without any further bleeding. There is no melena. She denies abdominal pain until today which she feels is related to eating spoiled food last evening. She denies rectal pain, burning, or itching. Her last colonoscopy was in December and revealed an adenomatous polyp and internal and external hemorrhoids.\par \par \par Note from 12/21/17 - The patient is status post colonoscopy performed on December 5, 2017. A 1.5 cm polyp was removed from the proximal transverse colon and was a tubular adenoma. The patient also has internal and external hemorrhoids which are asymptomatic. The patient feels well denying abdominal pain, diarrhea, constipation, melena, and bright red blood per rectum. We reviewed the blood work from the previous visit of November 28 which was normal.\par \par \par Note from 11/28/17 - The patient is a 52-year-old woman presents with rectal bleeding. 4 weeks ago, the patient had congestion and coughing. For a week, she had soft stools with blood mixed with brown stool. The water turned red. She denies melena. She has had no further bleeding and now has one solid bowel movement a day. She denies abdominal pain, heartburn, or dysphagia. She denies rectal pain, burning, or itching. Her weight is stable. She has never had a colonoscopy. The patient has not been hospitalized in the past year and denies any cardiac issues.\par \par Of note, the patient has a history of gross hematuria which was unexplained. She also admits to easy bruising.

## 2020-12-21 NOTE — CONSULT LETTER
[FreeTextEntry1] : Dear Dr. Zehra Greenwood,\[facility] \[facility] I had the pleasure of seeing your patient JU MARTINEZ in the office today.  My office note is attached. PLEASE READ THE "ASSESSMENT" SECTION OF THE NOTE TO SEE MY IMPRESSION AND PLAN.\par \[facility] Thank you very much for allowing me to participate in the care of your patient.\[facility] \[facility] Sincerely,\[facility] \[facility] Adán Flores M.D., FAC, [facility] Director, Celiac Program at [facility]  of Medicine\[facility] [employer] at [facility] Practice Director,\[facility] - Gastroenterology/Internal Medicine at [address]\[place] [address]\[facility] Tel: [phone]\[facility] Email: [email or web address] \[facility] \[facility] The attached note has been created using a voice recognition system (Dragon).  There may be some misspellings and typos.  Please call my office if you have any issues or questions.

## 2021-01-08 ENCOUNTER — APPOINTMENT (OUTPATIENT)
Dept: UROLOGY | Facility: CLINIC | Age: 56
End: 2021-01-08

## 2021-01-15 ENCOUNTER — APPOINTMENT (OUTPATIENT)
Dept: GASTROENTEROLOGY | Facility: AMBULATORY MEDICAL SERVICES | Age: 56
End: 2021-01-15
Payer: COMMERCIAL

## 2021-01-15 PROCEDURE — G0105: CPT

## 2021-01-19 RX ORDER — SODIUM SULFATE, POTASSIUM SULFATE, MAGNESIUM SULFATE 17.5; 3.13; 1.6 G/ML; G/ML; G/ML
17.5-3.13-1.6 SOLUTION, CONCENTRATE ORAL
Qty: 1 | Refills: 0 | Status: DISCONTINUED | COMMUNITY
Start: 2020-12-21 | End: 2021-01-19

## 2021-01-26 ENCOUNTER — APPOINTMENT (OUTPATIENT)
Dept: ULTRASOUND IMAGING | Facility: CLINIC | Age: 56
End: 2021-01-26
Payer: COMMERCIAL

## 2021-01-26 ENCOUNTER — OUTPATIENT (OUTPATIENT)
Dept: OUTPATIENT SERVICES | Facility: HOSPITAL | Age: 56
LOS: 1 days | End: 2021-01-26
Payer: COMMERCIAL

## 2021-01-26 DIAGNOSIS — Z00.8 ENCOUNTER FOR OTHER GENERAL EXAMINATION: ICD-10-CM

## 2021-01-26 DIAGNOSIS — N64.89 OTHER SPECIFIED DISORDERS OF BREAST: ICD-10-CM

## 2021-01-26 PROCEDURE — 76641 ULTRASOUND BREAST COMPLETE: CPT

## 2021-01-26 PROCEDURE — 76641 ULTRASOUND BREAST COMPLETE: CPT | Mod: 26,50

## 2021-02-19 ENCOUNTER — OUTPATIENT (OUTPATIENT)
Dept: OUTPATIENT SERVICES | Facility: HOSPITAL | Age: 56
LOS: 1 days | End: 2021-02-19
Payer: COMMERCIAL

## 2021-02-19 ENCOUNTER — APPOINTMENT (OUTPATIENT)
Dept: UROLOGY | Facility: CLINIC | Age: 56
End: 2021-02-19
Payer: COMMERCIAL

## 2021-02-19 VITALS — TEMPERATURE: 97.7 F | HEART RATE: 84 BPM | SYSTOLIC BLOOD PRESSURE: 179 MMHG | DIASTOLIC BLOOD PRESSURE: 102 MMHG

## 2021-02-19 DIAGNOSIS — R35.0 FREQUENCY OF MICTURITION: ICD-10-CM

## 2021-02-19 PROCEDURE — 52000 CYSTOURETHROSCOPY: CPT

## 2021-02-23 DIAGNOSIS — R31.29 OTHER MICROSCOPIC HEMATURIA: ICD-10-CM

## 2021-02-26 ENCOUNTER — NON-APPOINTMENT (OUTPATIENT)
Age: 56
End: 2021-02-26

## 2021-03-06 ENCOUNTER — NON-APPOINTMENT (OUTPATIENT)
Age: 56
End: 2021-03-06

## 2021-03-26 ENCOUNTER — RX CHANGE (OUTPATIENT)
Age: 56
End: 2021-03-26

## 2021-03-26 ENCOUNTER — APPOINTMENT (OUTPATIENT)
Dept: INTERNAL MEDICINE | Facility: CLINIC | Age: 56
End: 2021-03-26
Payer: COMMERCIAL

## 2021-03-26 ENCOUNTER — NON-APPOINTMENT (OUTPATIENT)
Age: 56
End: 2021-03-26

## 2021-03-26 VITALS — DIASTOLIC BLOOD PRESSURE: 86 MMHG | SYSTOLIC BLOOD PRESSURE: 140 MMHG

## 2021-03-26 VITALS
HEIGHT: 65.5 IN | RESPIRATION RATE: 17 BRPM | SYSTOLIC BLOOD PRESSURE: 145 MMHG | TEMPERATURE: 97.6 F | HEART RATE: 94 BPM | OXYGEN SATURATION: 99 % | WEIGHT: 131 LBS | BODY MASS INDEX: 21.56 KG/M2 | DIASTOLIC BLOOD PRESSURE: 90 MMHG

## 2021-03-26 DIAGNOSIS — Z87.19 PERSONAL HISTORY OF OTHER DISEASES OF THE DIGESTIVE SYSTEM: ICD-10-CM

## 2021-03-26 DIAGNOSIS — L85.3 XEROSIS CUTIS: ICD-10-CM

## 2021-03-26 DIAGNOSIS — Z86.69 PERSONAL HISTORY OF OTHER DISEASES OF THE NERVOUS SYSTEM AND SENSE ORGANS: ICD-10-CM

## 2021-03-26 DIAGNOSIS — I10 ESSENTIAL (PRIMARY) HYPERTENSION: ICD-10-CM

## 2021-03-26 DIAGNOSIS — K62.89 OTHER SPECIFIED DISEASES OF ANUS AND RECTUM: ICD-10-CM

## 2021-03-26 PROCEDURE — 99396 PREV VISIT EST AGE 40-64: CPT | Mod: 25

## 2021-03-26 PROCEDURE — 93000 ELECTROCARDIOGRAM COMPLETE: CPT

## 2021-03-26 PROCEDURE — 36415 COLL VENOUS BLD VENIPUNCTURE: CPT

## 2021-03-26 PROCEDURE — 99072 ADDL SUPL MATRL&STAF TM PHE: CPT

## 2021-03-26 RX ORDER — HYDROCORTISONE 25 MG/G
2.5 CREAM TOPICAL
Qty: 1 | Refills: 1 | Status: DISCONTINUED | COMMUNITY
Start: 2018-05-29 | End: 2021-03-26

## 2021-03-26 RX ORDER — PREDNISONE 50 MG/1
50 TABLET ORAL EVERY 6 HOURS
Qty: 3 | Refills: 0 | Status: DISCONTINUED | COMMUNITY
Start: 2021-01-19 | End: 2021-03-26

## 2021-03-26 NOTE — HEALTH RISK ASSESSMENT
[No] : No [0] : 2) Feeling down, depressed, or hopeless: Not at all (0) [With Family] : lives with family [Retired] : retired [] :  [# Of Children ___] : has [unfilled] children [Patient reported mammogram was normal] : Patient reported mammogram was normal [Patient reported PAP Smear was normal] : Patient reported PAP Smear was normal [] : No [[id number]] : 0 [Reports changes in hearing] : Reports no changes in hearing [Reports changes in vision] : Reports no changes in vision [Reports changes in dental health] : Reports no changes in dental health [MammogramDate] : 12/20 [PapSmearDate] : 08/20 [FreeTextEntry2] : teacher

## 2021-03-26 NOTE — PHYSICAL EXAM
[No Acute Distress] : no acute distress [Well Nourished] : well nourished [Well Developed] : well developed [Well-Appearing] : well-appearing [Normal Sclera/Conjunctiva] : normal sclera/conjunctiva [PERRL] : pupils equal round and reactive to light [EOMI] : extraocular movements intact [Normal Outer Ear/Nose] : the outer ears and nose were normal in appearance [Normal TMs] : both tympanic membranes were normal [No JVD] : no jugular venous distention [No Lymphadenopathy] : no lymphadenopathy [Supple] : supple [Thyroid Normal, No Nodules] : the thyroid was normal and there were no nodules present [No Respiratory Distress] : no respiratory distress  [No Accessory Muscle Use] : no accessory muscle use [Clear to Auscultation] : lungs were clear to auscultation bilaterally [Normal Rate] : normal rate  [Regular Rhythm] : with a regular rhythm [Normal S1, S2] : normal S1 and S2 [No Murmur] : no murmur heard [No Carotid Bruits] : no carotid bruits [No Varicosities] : no varicosities [Pedal Pulses Present] : the pedal pulses are present [No Edema] : there was no peripheral edema [No Extremity Clubbing/Cyanosis] : no extremity clubbing/cyanosis [Normal Appearance] : normal in appearance [No Nipple Discharge] : no nipple discharge [No Axillary Lymphadenopathy] : no axillary lymphadenopathy [Soft] : abdomen soft [Non Tender] : non-tender [Non-distended] : non-distended [No Masses] : no abdominal mass palpated [No HSM] : no HSM [Normal Bowel Sounds] : normal bowel sounds [Normal Posterior Cervical Nodes] : no posterior cervical lymphadenopathy [Normal Anterior Cervical Nodes] : no anterior cervical lymphadenopathy [No CVA Tenderness] : no CVA  tenderness [No Spinal Tenderness] : no spinal tenderness [No Joint Swelling] : no joint swelling [Grossly Normal Strength/Tone] : grossly normal strength/tone [No Rash] : no rash [Coordination Grossly Intact] : coordination grossly intact [No Focal Deficits] : no focal deficits [Normal Gait] : normal gait [Deep Tendon Reflexes (DTR)] : deep tendon reflexes were 2+ and symmetric [Normal Affect] : the affect was normal [Alert and Oriented x3] : oriented to person, place, and time [Normal Insight/Judgement] : insight and judgment were intact

## 2021-03-26 NOTE — PLAN
[FreeTextEntry1] : Depression screening negative\par BP is elevated here. She states at home her BP Is controlled. her blood pressure is managed by her cardiologist. Continue Enalapril 30 mg and norvasc 2.5\par Check labs today, including CBC, CMP, TSH, HbA1c, lipids \par pap, mammo and colonoscopy  UTD\par f/u 1 year or prn

## 2021-03-26 NOTE — HISTORY OF PRESENT ILLNESS
[FreeTextEntry1] : Annual physical [de-identified] : 56 y/o female with h/o HTN who is here for an annual physical.\par Sees cardiology every 3-4 months\par Has h/o low WBC. Saw a hematologist and everything was stable. Has follow up in June\par exercises 4 times per week. started during the pandemic\par diet is good\par pap 08/20\par mammo 12/20\par colonoscopy 1/21\par She requests a Rx for urea for hands and feet\par \par

## 2021-03-29 ENCOUNTER — TRANSCRIPTION ENCOUNTER (OUTPATIENT)
Age: 56
End: 2021-03-29

## 2021-03-29 ENCOUNTER — RX CHANGE (OUTPATIENT)
Age: 56
End: 2021-03-29

## 2021-04-01 ENCOUNTER — TRANSCRIPTION ENCOUNTER (OUTPATIENT)
Age: 56
End: 2021-04-01

## 2021-04-01 LAB
25(OH)D3 SERPL-MCNC: 37.8 NG/ML
ABO + RH PNL BLD: NORMAL
ALBUMIN SERPL ELPH-MCNC: 4.6 G/DL
ALP BLD-CCNC: 80 U/L
ALT SERPL-CCNC: 30 U/L
ANION GAP SERPL CALC-SCNC: 12 MMOL/L
APPEARANCE: CLEAR
AST SERPL-CCNC: 25 U/L
BACTERIA: NEGATIVE
BASOPHILS # BLD AUTO: 0.04 K/UL
BASOPHILS NFR BLD AUTO: 1.1 %
BILIRUB SERPL-MCNC: 0.4 MG/DL
BILIRUBIN URINE: NEGATIVE
BLOOD URINE: NEGATIVE
BUN SERPL-MCNC: 9 MG/DL
CALCIUM SERPL-MCNC: 10 MG/DL
CHLORIDE SERPL-SCNC: 104 MMOL/L
CHOLEST SERPL-MCNC: 198 MG/DL
CO2 SERPL-SCNC: 28 MMOL/L
COLOR: COLORLESS
CREAT SERPL-MCNC: 0.82 MG/DL
EOSINOPHIL # BLD AUTO: 0.07 K/UL
EOSINOPHIL NFR BLD AUTO: 2 %
ESTIMATED AVERAGE GLUCOSE: 117 MG/DL
GLUCOSE QUALITATIVE U: NEGATIVE
GLUCOSE SERPL-MCNC: 77 MG/DL
HBA1C MFR BLD HPLC: 5.7 %
HCT VFR BLD CALC: 43.7 %
HDLC SERPL-MCNC: 102 MG/DL
HGB BLD-MCNC: 14 G/DL
HYALINE CASTS: 0 /LPF
IMM GRANULOCYTES NFR BLD AUTO: 0.3 %
KETONES URINE: NEGATIVE
LDLC SERPL CALC-MCNC: 87 MG/DL
LEUKOCYTE ESTERASE URINE: NEGATIVE
LYMPHOCYTES # BLD AUTO: 1.23 K/UL
LYMPHOCYTES NFR BLD AUTO: 34.4 %
MAN DIFF?: NORMAL
MCHC RBC-ENTMCNC: 30 PG
MCHC RBC-ENTMCNC: 32 GM/DL
MCV RBC AUTO: 93.6 FL
MICROSCOPIC-UA: NORMAL
MONOCYTES # BLD AUTO: 0.43 K/UL
MONOCYTES NFR BLD AUTO: 12 %
NEUTROPHILS # BLD AUTO: 1.8 K/UL
NEUTROPHILS NFR BLD AUTO: 50.2 %
NITRITE URINE: NEGATIVE
NONHDLC SERPL-MCNC: 96 MG/DL
PH URINE: 6.5
PLATELET # BLD AUTO: 154 K/UL
POTASSIUM SERPL-SCNC: 4.1 MMOL/L
PROT SERPL-MCNC: 7.4 G/DL
PROTEIN URINE: NEGATIVE
RBC # BLD: 4.67 M/UL
RBC # FLD: 13 %
RED BLOOD CELLS URINE: 0 /HPF
SODIUM SERPL-SCNC: 143 MMOL/L
SPECIFIC GRAVITY URINE: 1.01
SQUAMOUS EPITHELIAL CELLS: 0 /HPF
TRIGL SERPL-MCNC: 49 MG/DL
TSH SERPL-ACNC: 1.7 UIU/ML
UROBILINOGEN URINE: NORMAL
VIT B12 SERPL-MCNC: 1610 PG/ML
WBC # FLD AUTO: 3.58 K/UL
WHITE BLOOD CELLS URINE: 0 /HPF

## 2021-04-15 ENCOUNTER — TRANSCRIPTION ENCOUNTER (OUTPATIENT)
Age: 56
End: 2021-04-15

## 2021-04-22 ENCOUNTER — APPOINTMENT (OUTPATIENT)
Dept: OTOLARYNGOLOGY | Facility: CLINIC | Age: 56
End: 2021-04-22
Payer: COMMERCIAL

## 2021-04-22 VITALS
WEIGHT: 133 LBS | BODY MASS INDEX: 21.38 KG/M2 | SYSTOLIC BLOOD PRESSURE: 169 MMHG | DIASTOLIC BLOOD PRESSURE: 84 MMHG | HEART RATE: 68 BPM | HEIGHT: 66 IN | TEMPERATURE: 97.7 F

## 2021-04-22 DIAGNOSIS — H93.299 OTHER ABNORMAL AUDITORY PERCEPTIONS, UNSPECIFIED EAR: ICD-10-CM

## 2021-04-22 PROCEDURE — 92570 ACOUSTIC IMMITANCE TESTING: CPT

## 2021-04-22 PROCEDURE — 92557 COMPREHENSIVE HEARING TEST: CPT

## 2021-04-22 PROCEDURE — 99072 ADDL SUPL MATRL&STAF TM PHE: CPT

## 2021-04-22 PROCEDURE — 99214 OFFICE O/P EST MOD 30 MIN: CPT

## 2021-04-22 RX ORDER — AZELASTINE HYDROCHLORIDE 137 UG/1
0.1 SPRAY, METERED NASAL TWICE DAILY
Qty: 30 | Refills: 1 | Status: COMPLETED | COMMUNITY
Start: 2021-03-29 | End: 2021-04-22

## 2021-04-22 RX ORDER — MULTIVITAMIN
TABLET ORAL
Refills: 0 | Status: COMPLETED | COMMUNITY
End: 2021-04-22

## 2021-04-22 NOTE — HISTORY OF PRESENT ILLNESS
[de-identified] : 55 yr old female c/o tinnitus AU, worse AD, for 5 days.  Family complains that she's not hearing. Lots of stress this week but she had tinnitus prior.  +HTN getting worse, has had to increase dosage of meds\par +URI 2 weeks ago\par +dizziness on and off in the past, worse last week.  No vertigo\par -hx otitis, noise exp, FH\par Head trauma no LOC twice last year

## 2021-04-22 NOTE — ASSESSMENT
[FreeTextEntry1] : CI removed\par  WNL w type A AU, ETD AU\par Rx Flonase \par MRI\par f/u after study is complete

## 2021-04-22 NOTE — PHYSICAL EXAM
[Normal] : mucosa is normal [Midline] : trachea located in midline position [de-identified] : CI AU

## 2021-04-22 NOTE — REVIEW OF SYSTEMS
[Ear Pain] : ear pain [Ear Itch] : ear itch [Dizziness] : dizziness [Recurrent Ear Infections] : recurrent ear infections [Lightheadedness] : lightheadedness [Nasal Congestion] : nasal congestion [Recurrent Sinus Infections] : recurrent sinus infections [Sinus Pain] : sinus pain [Sinus Pressure] : sinus pressure [Easy Bruising] : a tendency for easy bruising [As Noted in HPI] : as noted in HPI [Negative] : Head and Neck [FreeTextEntry2] : daytime sleepiness  [FreeTextEntry9] : joint aches, muscle aches

## 2021-05-03 ENCOUNTER — APPOINTMENT (OUTPATIENT)
Dept: MRI IMAGING | Facility: CLINIC | Age: 56
End: 2021-05-03

## 2021-05-11 ENCOUNTER — APPOINTMENT (OUTPATIENT)
Dept: OTOLARYNGOLOGY | Facility: CLINIC | Age: 56
End: 2021-05-11
Payer: COMMERCIAL

## 2021-05-11 VITALS
DIASTOLIC BLOOD PRESSURE: 82 MMHG | BODY MASS INDEX: 21.53 KG/M2 | SYSTOLIC BLOOD PRESSURE: 128 MMHG | TEMPERATURE: 97.2 F | HEART RATE: 68 BPM | HEIGHT: 66 IN | WEIGHT: 134 LBS

## 2021-05-11 PROCEDURE — 99072 ADDL SUPL MATRL&STAF TM PHE: CPT

## 2021-05-11 PROCEDURE — 99213 OFFICE O/P EST LOW 20 MIN: CPT

## 2021-05-11 NOTE — PHYSICAL EXAM
[de-identified] : CI AU [Normal] : mucosa is normal [Midline] : trachea located in midline position

## 2021-05-11 NOTE — REVIEW OF SYSTEMS
[As Noted in HPI] : as noted in HPI [Ear Pain] : ear pain [Ear Itch] : ear itch [Dizziness] : dizziness [Recurrent Ear Infections] : recurrent ear infections [Lightheadedness] : lightheadedness [Recurrent Sinus Infections] : recurrent sinus infections [Nasal Congestion] : nasal congestion [Sinus Pain] : sinus pain [Sinus Pressure] : sinus pressure [Easy Bruising] : a tendency for easy bruising [Negative] : Head and Neck [FreeTextEntry2] : daytime sleepiness  [FreeTextEntry9] : joint aches, muscle aches

## 2021-05-11 NOTE — ASSESSMENT
[FreeTextEntry1] : discussed management of tinnitus with lipa-flavonoids  (avoid Niacin since it can effect BP)\par annual audio\par \par f/u w cardiologist for management of HTN\par

## 2021-05-11 NOTE — HISTORY OF PRESENT ILLNESS
[de-identified] : 55 yr old female c/o tinnitus AU, worse AD, for 5 days.  Family complains that she's not hearing. Lots of stress this week but she had tinnitus prior.  +HTN getting worse, has had to increase dosage of meds, which have now been adjusted down.\par +dizziness on and off in the past, worse last week.  No vertigo\par -hx otitis, noise exp, FH\par Head trauma no LOC twice last year\par Audio symmetrical mild HF SNHL \par MRI 4/28/2021 mild chronic small vessel ischemic disease, loops of AICA enter IAC of uncertain clinical clinical significance.

## 2021-05-25 ENCOUNTER — LABORATORY RESULT (OUTPATIENT)
Age: 56
End: 2021-05-25

## 2021-05-25 ENCOUNTER — APPOINTMENT (OUTPATIENT)
Dept: NEPHROLOGY | Facility: CLINIC | Age: 56
End: 2021-05-25
Payer: COMMERCIAL

## 2021-05-25 VITALS — RESPIRATION RATE: 17 BRPM | HEART RATE: 80 BPM | SYSTOLIC BLOOD PRESSURE: 149 MMHG | DIASTOLIC BLOOD PRESSURE: 93 MMHG

## 2021-05-25 VITALS — RESPIRATION RATE: 17 BRPM | DIASTOLIC BLOOD PRESSURE: 100 MMHG | HEART RATE: 80 BPM | SYSTOLIC BLOOD PRESSURE: 158 MMHG

## 2021-05-25 PROCEDURE — 99244 OFF/OP CNSLTJ NEW/EST MOD 40: CPT

## 2021-05-25 PROCEDURE — 99072 ADDL SUPL MATRL&STAF TM PHE: CPT

## 2021-05-25 RX ORDER — ENALAPRIL MALEATE 10 MG/1
10 TABLET ORAL
Qty: 90 | Refills: 0 | Status: DISCONTINUED | COMMUNITY
Start: 2020-10-11 | End: 2021-05-25

## 2021-05-25 NOTE — PHYSICAL EXAM
[General Appearance - Alert] : alert [General Appearance - In No Acute Distress] : in no acute distress [Sclera] : the sclera and conjunctiva were normal [PERRL With Normal Accommodation] : pupils were equal in size, round, and reactive to light [Extraocular Movements] : extraocular movements were intact [Outer Ear] : the ears and nose were normal in appearance [Oropharynx] : the oropharynx was normal [Neck Appearance] : the appearance of the neck was normal [Neck Cervical Mass (___cm)] : no neck mass was observed [Jugular Venous Distention Increased] : there was no jugular-venous distention [Thyroid Diffuse Enlargement] : the thyroid was not enlarged [Thyroid Nodule] : there were no palpable thyroid nodules [Auscultation Breath Sounds / Voice Sounds] : lungs were clear to auscultation bilaterally [Heart Rate And Rhythm] : heart rate was normal and rhythm regular [Heart Sounds] : normal S1 and S2 [Heart Sounds Gallop] : no gallops [Murmurs] : no murmurs [Heart Sounds Pericardial Friction Rub] : no pericardial rub [Full Pulse] : the pedal pulses are present [Edema] : there was no peripheral edema [Bowel Sounds] : normal bowel sounds [Abdomen Soft] : soft [Abdomen Tenderness] : non-tender [Abdomen Mass (___ Cm)] : no abdominal mass palpated [Cervical Lymph Nodes Enlarged Posterior Bilaterally] : posterior cervical [Cervical Lymph Nodes Enlarged Anterior Bilaterally] : anterior cervical [Supraclavicular Lymph Nodes Enlarged Bilaterally] : supraclavicular [Axillary Lymph Nodes Enlarged Bilaterally] : axillary [No CVA Tenderness] : no ~M costovertebral angle tenderness [No Spinal Tenderness] : no spinal tenderness [Abnormal Walk] : normal gait [Nail Clubbing] : no clubbing  or cyanosis of the fingernails [Musculoskeletal - Swelling] : no joint swelling seen [Motor Tone] : muscle strength and tone were normal [Skin Color & Pigmentation] : normal skin color and pigmentation [Skin Turgor] : normal skin turgor [] : no rash [Deep Tendon Reflexes (DTR)] : deep tendon reflexes were 2+ and symmetric [Sensation] : the sensory exam was normal to light touch and pinprick [No Focal Deficits] : no focal deficits [Oriented To Time, Place, And Person] : oriented to person, place, and time [Impaired Insight] : insight and judgment were intact [Affect] : the affect was normal

## 2021-05-28 LAB
ALBUMIN MFR SERPL ELPH: 57.4 %
ALBUMIN SERPL ELPH-MCNC: 4.8 G/DL
ALBUMIN SERPL-MCNC: 4.3 G/DL
ALBUMIN/GLOB SERPL: 1.3 RATIO
ALDOSTERONE SERUM: 6.8 NG/DL
ALP BLD-CCNC: 79 U/L
ALPHA1 GLOB MFR SERPL ELPH: 3.5 %
ALPHA1 GLOB SERPL ELPH-MCNC: 0.3 G/DL
ALPHA2 GLOB MFR SERPL ELPH: 9 %
ALPHA2 GLOB SERPL ELPH-MCNC: 0.7 G/DL
ALT SERPL-CCNC: 30 U/L
ANION GAP SERPL CALC-SCNC: 11 MMOL/L
APPEARANCE: CLEAR
AST SERPL-CCNC: 27 U/L
B-GLOBULIN MFR SERPL ELPH: 11.7 %
B-GLOBULIN SERPL ELPH-MCNC: 0.9 G/DL
BILIRUB SERPL-MCNC: 0.6 MG/DL
BILIRUBIN URINE: NEGATIVE
BLOOD URINE: NEGATIVE
BUN SERPL-MCNC: 12 MG/DL
CALCIUM SERPL-MCNC: 10.3 MG/DL
CHLORIDE SERPL-SCNC: 102 MMOL/L
CO2 SERPL-SCNC: 27 MMOL/L
COLOR: NORMAL
CREAT SERPL-MCNC: 0.85 MG/DL
CREAT SPEC-SCNC: 38 MG/DL
CREAT/PROT UR: NORMAL RATIO
DEPRECATED KAPPA LC FREE/LAMBDA SER: 1.33 RATIO
DEPRECATED KAPPA LC FREE/LAMBDA SER: 1.33 RATIO
ERYTHROCYTE [SEDIMENTATION RATE] IN BLOOD BY WESTERGREN METHOD: 6 MM/HR
GAMMA GLOB FLD ELPH-MCNC: 1.4 G/DL
GAMMA GLOB MFR SERPL ELPH: 18.4 %
GLUCOSE QUALITATIVE U: NEGATIVE
GLUCOSE SERPL-MCNC: 93 MG/DL
HAV IGM SER QL: NONREACTIVE
HBV CORE IGM SER QL: NONREACTIVE
HBV SURFACE AG SER QL: NONREACTIVE
HCV AB SER QL: NONREACTIVE
HCV S/CO RATIO: 0.7 S/CO
HGB A MFR BLD: 97.2 %
HGB A2 MFR BLD: 2.8 %
HGB FRACT BLD-IMP: NORMAL
HIV1+2 AB SPEC QL IA.RAPID: NONREACTIVE
IGA SER QL IEP: 277 MG/DL
IGG SER QL IEP: 1557 MG/DL
IGM SER QL IEP: 93 MG/DL
INR PPP: 1.08 RATIO
INTERPRETATION SERPL IEP-IMP: NORMAL
KAPPA LC 24H UR QL: NORMAL
KAPPA LC CSF-MCNC: 1.03 MG/DL
KAPPA LC CSF-MCNC: 1.03 MG/DL
KAPPA LC SERPL-MCNC: 1.37 MG/DL
KAPPA LC SERPL-MCNC: 1.37 MG/DL
KETONES URINE: NEGATIVE
LEUKOCYTE ESTERASE URINE: NEGATIVE
M PROTEIN SPEC IFE-MCNC: NORMAL
NITRITE URINE: NEGATIVE
PH URINE: 7
POTASSIUM SERPL-SCNC: 3.9 MMOL/L
PROT SERPL-MCNC: 7.5 G/DL
PROT SERPL-MCNC: 7.5 G/DL
PROT UR-MCNC: <4 MG/DL
PROTEIN URINE: NEGATIVE
PROTEINASE3 AB SER IA-ACNC: <5 UNITS
PROTEINASE3 AB SER-ACNC: NEGATIVE
PT BLD: 12.7 SEC
SODIUM SERPL-SCNC: 141 MMOL/L
SPECIFIC GRAVITY URINE: 1.01
TSH SERPL-ACNC: 1.28 UIU/ML
URATE SERPL-MCNC: 3.9 MG/DL
UROBILINOGEN URINE: NORMAL

## 2021-06-02 ENCOUNTER — NON-APPOINTMENT (OUTPATIENT)
Age: 56
End: 2021-06-02

## 2021-06-02 LAB
METANEPH 24H UR-MRATE: NORMAL UG/24 HR
METANEPHRINE, PL: 28.5 PG/ML
METANEPHS 24H UR-MCNC: 50 UG/L
MPO AB + PR3 PNL SER: NORMAL
NORMETANEPHRINE 24 HR URINE: NORMAL UG/24 HR
NORMETANEPHRINE 24H UR-MCNC: 92 UG/L
NORMETANEPHRINE, PL: 170.4 PG/ML
PHOSPHOLIPASE A2 RECEPTOR ELISA: <1.8 RU/ML

## 2021-06-03 LAB
CORTICOSTEROID BIND GLOBULIN: 2.9 MG/DL
CORTIS SERPL-MCNC: 9.6 UG/DL
CORTISOL, FREE: 0.32 UG/DL
PFCX: 3.3 %
RENIN ACTIVITY, PLASMA: 0.28 NG/ML/HR

## 2021-06-08 ENCOUNTER — APPOINTMENT (OUTPATIENT)
Dept: NEPHROLOGY | Facility: CLINIC | Age: 56
End: 2021-06-08
Payer: COMMERCIAL

## 2021-06-08 VITALS
WEIGHT: 132.28 LBS | HEART RATE: 70 BPM | TEMPERATURE: 97.6 F | SYSTOLIC BLOOD PRESSURE: 160 MMHG | DIASTOLIC BLOOD PRESSURE: 90 MMHG | BODY MASS INDEX: 21.35 KG/M2 | OXYGEN SATURATION: 98 %

## 2021-06-08 PROCEDURE — 99072 ADDL SUPL MATRL&STAF TM PHE: CPT

## 2021-06-08 PROCEDURE — 93784 AMBL BP MNTR W/SOFTWARE: CPT

## 2021-06-08 PROCEDURE — 99214 OFFICE O/P EST MOD 30 MIN: CPT

## 2021-06-08 NOTE — PHYSICAL EXAM
[General Appearance - Alert] : alert [General Appearance - In No Acute Distress] : in no acute distress [Sclera] : the sclera and conjunctiva were normal [PERRL With Normal Accommodation] : pupils were equal in size, round, and reactive to light [Extraocular Movements] : extraocular movements were intact [Outer Ear] : the ears and nose were normal in appearance [Oropharynx] : the oropharynx was normal [Neck Appearance] : the appearance of the neck was normal [Neck Cervical Mass (___cm)] : no neck mass was observed [Jugular Venous Distention Increased] : there was no jugular-venous distention [Thyroid Diffuse Enlargement] : the thyroid was not enlarged [Thyroid Nodule] : there were no palpable thyroid nodules [Auscultation Breath Sounds / Voice Sounds] : lungs were clear to auscultation bilaterally [Heart Rate And Rhythm] : heart rate was normal and rhythm regular [Heart Sounds] : normal S1 and S2 [Heart Sounds Gallop] : no gallops [Murmurs] : no murmurs [Heart Sounds Pericardial Friction Rub] : no pericardial rub [Bowel Sounds] : normal bowel sounds [Abdomen Soft] : soft [Abdomen Tenderness] : non-tender [Abdomen Mass (___ Cm)] : no abdominal mass palpated [Skin Color & Pigmentation] : normal skin color and pigmentation [Skin Turgor] : normal skin turgor [] : no rash [Oriented To Time, Place, And Person] : oriented to person, place, and time [Impaired Insight] : insight and judgment were intact [Affect] : the affect was normal

## 2021-06-14 ENCOUNTER — APPOINTMENT (OUTPATIENT)
Dept: ENDOCRINOLOGY | Facility: CLINIC | Age: 56
End: 2021-06-14
Payer: COMMERCIAL

## 2021-06-14 VITALS
OXYGEN SATURATION: 99 % | BODY MASS INDEX: 21.21 KG/M2 | HEART RATE: 83 BPM | DIASTOLIC BLOOD PRESSURE: 90 MMHG | WEIGHT: 132 LBS | SYSTOLIC BLOOD PRESSURE: 150 MMHG | TEMPERATURE: 97.8 F | HEIGHT: 66 IN

## 2021-06-14 PROCEDURE — 99205 OFFICE O/P NEW HI 60 MIN: CPT

## 2021-06-14 PROCEDURE — 99072 ADDL SUPL MATRL&STAF TM PHE: CPT

## 2021-06-14 RX ORDER — AMLODIPINE BESYLATE 5 MG/1
5 TABLET ORAL
Qty: 30 | Refills: 3 | Status: DISCONTINUED | COMMUNITY
Start: 2021-05-25 | End: 2021-06-14

## 2021-06-16 ENCOUNTER — APPOINTMENT (OUTPATIENT)
Dept: OTOLARYNGOLOGY | Facility: CLINIC | Age: 56
End: 2021-06-16
Payer: COMMERCIAL

## 2021-06-16 VITALS
SYSTOLIC BLOOD PRESSURE: 136 MMHG | DIASTOLIC BLOOD PRESSURE: 84 MMHG | HEIGHT: 66 IN | BODY MASS INDEX: 21.21 KG/M2 | WEIGHT: 132 LBS | TEMPERATURE: 98.2 F | HEART RATE: 76 BPM

## 2021-06-16 DIAGNOSIS — H90.3 SENSORINEURAL HEARING LOSS, BILATERAL: ICD-10-CM

## 2021-06-16 DIAGNOSIS — M26.609 UNSPECIFIED TEMPOROMANDIBULAR JOINT DISORDER: ICD-10-CM

## 2021-06-16 DIAGNOSIS — H69.83 OTHER SPECIFIED DISORDERS OF EUSTACHIAN TUBE, BILATERAL: ICD-10-CM

## 2021-06-16 PROCEDURE — 99213 OFFICE O/P EST LOW 20 MIN: CPT

## 2021-06-16 PROCEDURE — 99072 ADDL SUPL MATRL&STAF TM PHE: CPT

## 2021-06-16 NOTE — END OF VISIT
[FreeTextEntry3] : I personally saw and examined JU MARTINEZ in detail. I spoke to ANNEL Head regarding the assessment and plan of care. I reviewed the above assessment and plan of care, and agree. I have made changes in changes in the body of the note where appropriate.I personally reviewed the HPI, PMH, FH, SH, ROS and medications/allergies. I have spoken to ANNEL Head regarding the history and have personally determined the assessment and plan of care, and documented this myself. I was present and participated in all key portions of the encounter and all procedures noted above. I have made changes in the body of the note where appropriate.\par \par Attesting Faculty: See Attending Signature Below \par \par \par  [Time Spent: ___ minutes] : I have spent [unfilled] minutes of time on the encounter.

## 2021-06-16 NOTE — PHYSICAL EXAM
[Midline] : trachea located in midline position [de-identified] : right sided crepitus  [Normal] : no rashes

## 2021-06-16 NOTE — HISTORY OF PRESENT ILLNESS
[FreeTextEntry1] : Patient is a 55-year-old woman here to establish care for elevated serum metanephrine level.\par \par Patient reported a history of hypertension in her late 30s.  She reported that she was started on a blood pressure medication, she does not recall the name of the medication. She was getting dark spots on her legs.  She went to her dermatologist, and thought that the rash is related to the medication side effects.  She was advised to stop her blood pressure medication.\par \par In her late 40s, she was restarted on her blood pressure medication due to consistently elevated blood pressure.  Likely she was started with enalapril 5 mg once daily.\par \par She also reported a history of arthritis.  She reported that she had bilateral knee swelling that required tapping of the joints and drainage of the fluids.\par \par She is currently maintained on benazepril 20 mg once daily for blood pressure, amlodipine 2.5 mg once daily.  Reports that if she is on a higher dose of the amlodipine, she gets dizziness.  She is also taking carvedilol 3.125 mg once daily.  She reports that if she takes the carvedilol 3.125 mg twice daily, she has excessive dizziness. \par \par She is now having tinnitus in he ears also. \par \par She reports that she sometimes has flushing of her skin.  \par \par There's no family history of pheochromocytoma or MEN syndrome. \par \par Plasma metanephrines were checked and normetanephrine was elevated at 170.4 pg/ml (0-136)\par \par \par

## 2021-06-16 NOTE — ASSESSMENT
[FreeTextEntry1] : Patient complains of right sided tinnitus since April 14, today ringing is very faint \par \par Tinnitus:\par -audio reviewed wnl \par -continue with acupuncture, chiropractic adjustment, and physical therapy \par -CBD oil advised\par -stress reduction advised\par \par TMJ:\par -soft food diet \par -dental evaluation \par -Advil for pain \par -warm and cold compresses\par \par f/u prn

## 2021-06-16 NOTE — HISTORY OF PRESENT ILLNESS
[de-identified] : Patient complains of tinnitus right ear since April 14 2021. States she had a stressor before the tinnitus started and severe neck pain. States tinnitus is a constant ringing. She going to physical therapy, chiropractic adjustment, message therapy and acupuncture. There are days that the tinnitus is gone and low. Today tinnitus is presents but very low. Pt has no ear pain, ear drainage, hearing loss, vertigo, nasal congestion, nasal discharge, epistaxis, sinus infections, facial pain, facial pressure, throat pain, dysphagia or fevers\par \par  [Hearing Loss] : no hearing loss [Tinnitus] : tinnitus [Otorrhea] : no otorrhea [Eustachian Tube Dysfunction] : no eustachian tube dysfunction [Cholesteatoma] : no cholesteatoma [Early Onset Hearing Loss] : no early onset hearing loss [Stroke] : no stroke [Allergic Rhinitis] : no allergic rhinitis [Adenoidectomy] : no adenoidectomy [Allergies] : no allergies [Asthma] : no asthma [Hyperthyroidism] : no hyperthyroidism [Sialadenitis] : no sialadenitis [Hodgkin Disease] : no hodgkin disease [Non-Hodgkin Lymphoma] : no non-hodgkin lymphoma [None] : No risk factors have been identified. [Graves Disease] : no graves disease [Thyroid Cancer] : no thyroid cancer

## 2021-06-18 LAB
METANEPHRINE, PL: 52.8 PG/ML
NORMETANEPHRINE, PL: 128.3 PG/ML

## 2021-06-20 LAB
CREAT 24H UR-MCNC: 1.2 G/24 H
CREAT ?TM UR-MCNC: 59 MG/DL
PROT ?TM UR-MCNC: 24 HR
SPECIMEN VOL 24H UR: 2100 ML

## 2021-06-21 ENCOUNTER — APPOINTMENT (OUTPATIENT)
Dept: INTERNAL MEDICINE | Facility: CLINIC | Age: 56
End: 2021-06-21
Payer: COMMERCIAL

## 2021-06-21 VITALS — SYSTOLIC BLOOD PRESSURE: 138 MMHG | DIASTOLIC BLOOD PRESSURE: 82 MMHG

## 2021-06-21 VITALS
BODY MASS INDEX: 21.21 KG/M2 | HEART RATE: 78 BPM | TEMPERATURE: 99.1 F | DIASTOLIC BLOOD PRESSURE: 90 MMHG | OXYGEN SATURATION: 98 % | WEIGHT: 132 LBS | SYSTOLIC BLOOD PRESSURE: 160 MMHG | HEIGHT: 66 IN

## 2021-06-21 DIAGNOSIS — Z23 ENCOUNTER FOR IMMUNIZATION: ICD-10-CM

## 2021-06-21 DIAGNOSIS — H93.13 TINNITUS, BILATERAL: ICD-10-CM

## 2021-06-21 PROCEDURE — 99072 ADDL SUPL MATRL&STAF TM PHE: CPT

## 2021-06-21 PROCEDURE — 99214 OFFICE O/P EST MOD 30 MIN: CPT

## 2021-06-21 RX ORDER — ENALAPRIL MALEATE 20 MG/1
20 TABLET ORAL
Refills: 0 | Status: DISCONTINUED | COMMUNITY
End: 2021-06-21

## 2021-06-21 NOTE — PLAN
[FreeTextEntry1] : Pt tolerated Adacel with no acute reactions\par She will D/C Enalapril and start a trial of Losartan instead to see if she has any improvement with her Tinnitus as she is concerned that this medication was a possible cause \par Pt will keep me updated in 2 weeks on her BP.  She will follow up in 3 months

## 2021-06-21 NOTE — REVIEW OF SYSTEMS
[Negative] : Constitutional [FreeTextEntry4] : tinnitus  [de-identified] : small abrasions left leg

## 2021-06-21 NOTE — HISTORY OF PRESENT ILLNESS
[FreeTextEntry8] : 56 y/o female presents to the office today for a tetanus vaccine as she was scratched by a rust screen yesterday on her left leg.\par She also has complaint of Tinnitus that she was previously diagnosed with.  She feels that this maybe secondary to Enalapril as she sees this is one possible side effect.  Pt states cardiologist was advised of this but still didn’t change medication.  \par Pts Home BP levels are in good ranges. Pt showed me her reading and reading prior to office visit was 128/82  [FreeTextEntry1] : 54 y/o female presents to the office today for a tetanus vaccine.

## 2021-06-21 NOTE — REVIEW OF SYSTEMS
[Negative] : Constitutional [FreeTextEntry4] : tinnitus  [de-identified] : small abrasions left leg

## 2021-06-29 ENCOUNTER — NON-APPOINTMENT (OUTPATIENT)
Age: 56
End: 2021-06-29

## 2021-06-29 RX ORDER — CARVEDILOL 3.12 MG/1
3.12 TABLET, FILM COATED ORAL
Refills: 0 | Status: COMPLETED | COMMUNITY
End: 2021-06-29

## 2021-06-30 ENCOUNTER — APPOINTMENT (OUTPATIENT)
Dept: NEPHROLOGY | Facility: CLINIC | Age: 56
End: 2021-06-30

## 2021-07-01 LAB
DOPAMINE UR-MCNC: 146 UG/L
DOPAMINE, UR, 24HR: 307 UG/24 HR
EPINEPH UR-MCNC: 4 UG/L
EPINEPHRINE, U, 24HR: 8 UG/24 HR
NOREPINEPH UR-MCNC: 22 UG/L
NOREPINEPHRINE,U,24H: 46 UG/24 HR

## 2021-07-17 ENCOUNTER — TRANSCRIPTION ENCOUNTER (OUTPATIENT)
Age: 56
End: 2021-07-17

## 2021-07-23 ENCOUNTER — APPOINTMENT (OUTPATIENT)
Dept: INTERNAL MEDICINE | Facility: CLINIC | Age: 56
End: 2021-07-23

## 2021-08-31 ENCOUNTER — RESULT REVIEW (OUTPATIENT)
Age: 56
End: 2021-08-31

## 2021-09-03 ENCOUNTER — APPOINTMENT (OUTPATIENT)
Dept: INTERNAL MEDICINE | Facility: CLINIC | Age: 56
End: 2021-09-03
Payer: COMMERCIAL

## 2021-09-03 VITALS
SYSTOLIC BLOOD PRESSURE: 150 MMHG | HEART RATE: 83 BPM | HEIGHT: 66 IN | TEMPERATURE: 98.5 F | WEIGHT: 125 LBS | OXYGEN SATURATION: 99 % | BODY MASS INDEX: 20.09 KG/M2 | DIASTOLIC BLOOD PRESSURE: 90 MMHG

## 2021-09-03 VITALS — SYSTOLIC BLOOD PRESSURE: 122 MMHG | DIASTOLIC BLOOD PRESSURE: 82 MMHG

## 2021-09-03 PROCEDURE — 99214 OFFICE O/P EST MOD 30 MIN: CPT

## 2021-09-06 NOTE — PLAN
[FreeTextEntry1] : BP is well controlled and pt has been monitoring BP at home with good numbers\par She will continue with her current medication and dose\par Recommend she drink plenty of fluids 6-8 cups daily and make sure she does this every day\par If she continues to get leg cramps recommend she see neurology for nerve conduction testing \par She will continue her routine follow ups with Dr Ortiz

## 2021-09-06 NOTE — HISTORY OF PRESENT ILLNESS
[FreeTextEntry1] : 55 year old female presents here for leg cramping [de-identified] : Pt presents to the office today for BP check\par She has been feeling well overall \par She has been doing well with her medication no side effects\par She gets intermittent leg pains and cramps at night worse when laying down in bed\par Pt doesn’t drink the same amount of fluids daily \par

## 2021-09-13 RX ORDER — AMLODIPINE BESYLATE 2.5 MG/1
2.5 TABLET ORAL
Qty: 90 | Refills: 0 | Status: COMPLETED | COMMUNITY
Start: 2020-12-14 | End: 2021-09-13

## 2021-10-31 ENCOUNTER — RX CHANGE (OUTPATIENT)
Age: 56
End: 2021-10-31

## 2021-12-07 ENCOUNTER — APPOINTMENT (OUTPATIENT)
Dept: NEPHROLOGY | Facility: CLINIC | Age: 56
End: 2021-12-07

## 2021-12-10 ENCOUNTER — RX RENEWAL (OUTPATIENT)
Age: 56
End: 2021-12-10

## 2021-12-21 ENCOUNTER — APPOINTMENT (OUTPATIENT)
Dept: MAMMOGRAPHY | Facility: CLINIC | Age: 56
End: 2021-12-21
Payer: COMMERCIAL

## 2021-12-21 ENCOUNTER — OUTPATIENT (OUTPATIENT)
Dept: OUTPATIENT SERVICES | Facility: HOSPITAL | Age: 56
LOS: 1 days | End: 2021-12-21
Payer: COMMERCIAL

## 2021-12-21 ENCOUNTER — APPOINTMENT (OUTPATIENT)
Dept: ULTRASOUND IMAGING | Facility: CLINIC | Age: 56
End: 2021-12-21
Payer: COMMERCIAL

## 2021-12-21 DIAGNOSIS — Z00.8 ENCOUNTER FOR OTHER GENERAL EXAMINATION: ICD-10-CM

## 2021-12-21 PROCEDURE — 77067 SCR MAMMO BI INCL CAD: CPT | Mod: 26

## 2021-12-21 PROCEDURE — 76641 ULTRASOUND BREAST COMPLETE: CPT | Mod: 26,50

## 2021-12-21 PROCEDURE — 77063 BREAST TOMOSYNTHESIS BI: CPT | Mod: 26

## 2021-12-21 PROCEDURE — 76641 ULTRASOUND BREAST COMPLETE: CPT

## 2021-12-21 PROCEDURE — 77067 SCR MAMMO BI INCL CAD: CPT

## 2021-12-21 PROCEDURE — 77063 BREAST TOMOSYNTHESIS BI: CPT

## 2022-01-11 ENCOUNTER — APPOINTMENT (OUTPATIENT)
Dept: NEUROSURGERY | Facility: CLINIC | Age: 57
End: 2022-01-11
Payer: COMMERCIAL

## 2022-01-11 VITALS
HEIGHT: 66 IN | HEART RATE: 70 BPM | TEMPERATURE: 97 F | WEIGHT: 129 LBS | DIASTOLIC BLOOD PRESSURE: 102 MMHG | SYSTOLIC BLOOD PRESSURE: 158 MMHG | OXYGEN SATURATION: 99 % | BODY MASS INDEX: 20.73 KG/M2

## 2022-01-11 PROCEDURE — 99203 OFFICE O/P NEW LOW 30 MIN: CPT

## 2022-01-11 RX ORDER — MOMETASONE 50 UG/1
50 SPRAY, METERED NASAL
Qty: 3 | Refills: 3 | Status: DISCONTINUED | COMMUNITY
Start: 2021-04-26 | End: 2022-01-11

## 2022-01-11 RX ORDER — UREA 40 G/100G
40 CREAM TOPICAL TWICE DAILY
Qty: 1 | Refills: 1 | Status: DISCONTINUED | COMMUNITY
Start: 2021-03-26 | End: 2022-01-11

## 2022-01-11 RX ORDER — HYDROCORTISONE ACETATE 25 MG/1
25 SUPPOSITORY RECTAL
Qty: 40 | Refills: 1 | Status: DISCONTINUED | COMMUNITY
Start: 2019-10-31 | End: 2022-01-11

## 2022-01-11 RX ORDER — FLUTICASONE PROPIONATE 50 UG/1
50 SPRAY, METERED NASAL
Qty: 3 | Refills: 2 | Status: DISCONTINUED | COMMUNITY
Start: 2021-04-22 | End: 2022-01-11

## 2022-01-12 NOTE — HISTORY OF PRESENT ILLNESS
[de-identified] : Ms. MARTINEZ is a pleasant 56 year old female who presents today with a chief complaint of RIGHT ear pulsatile tinnitus.  It first started in 4/2021, 2 weeks after the pfizer covid vaccine.  Since that time it has been getting progressively better.  It is described as a constant, whooshing sound that is NOT sync with her pulse.  Turning her head to the left side improves the sound.  She has been seen by ENT x3 in the past and had a normal hearing test, normal ear exam as per patient.  She denies any headaches, blurry vision or diplopia.  She saw the ophthalmologist 2 months ago and is having work up for glaucoma, otherwise normal.

## 2022-01-12 NOTE — ASSESSMENT
[FreeTextEntry1] : Impression:\par RIGHT Pulsatile and Non-Pulsatile Tinnitus\par No Headaches or Vision Complaints\par Normal Hearing Tests with ENT per Patient\par \par We discussed possible causes of pulsatile tinnitus including:\par ENT causes such as semicircular canal dehiscence, tumor, ototoxicity\par Cardiac causes such as aortic stenosis, valve disease, HTN, other causes of heart murmur\par Anemia\par Thyroid disease\par Cerebrovascular causes such as arteriovenous malformation (AVM), dural arteriovenous fistula (dAVF), venous sinus stenosis, venous aneurysm, large trans-mastoid emissary vein, carotid stenosis, jugular bulb diverticulum \par \par MRI Head w/wo [facility]: Unremarkable\par \par \par Plan:\par MRA Head w/wo TRICKS Protocol to r/o AVM or dAVF\par Premedication for Contrast Allergy:\par         Prednisone 50mg PO 13 hours prior to procedure\par         Prednisone 50mg PO 7 hours prior to procedure\par         Prednisone 50mg PO 1 hour prior to procedure\par         Benadryl 50mg PO 1 hour prior to procedure \par Follow Up with Me After Imaging

## 2022-01-20 ENCOUNTER — TRANSCRIPTION ENCOUNTER (OUTPATIENT)
Age: 57
End: 2022-01-20

## 2022-01-22 ENCOUNTER — TRANSCRIPTION ENCOUNTER (OUTPATIENT)
Age: 57
End: 2022-01-22

## 2022-02-17 ENCOUNTER — APPOINTMENT (OUTPATIENT)
Dept: NEUROSURGERY | Facility: CLINIC | Age: 57
End: 2022-02-17
Payer: COMMERCIAL

## 2022-02-17 DIAGNOSIS — H93.A9 PULSATILE TINNITUS, UNSPECIFIED EAR: ICD-10-CM

## 2022-02-17 PROCEDURE — 99213 OFFICE O/P EST LOW 20 MIN: CPT | Mod: 95

## 2022-02-17 RX ORDER — DIPHENHYDRAMINE HCL 50 MG/1
50 CAPSULE ORAL
Qty: 1 | Refills: 0 | Status: DISCONTINUED | COMMUNITY
Start: 2022-01-11 | End: 2022-02-17

## 2022-02-17 RX ORDER — PREDNISONE 50 MG/1
50 TABLET ORAL
Qty: 3 | Refills: 0 | Status: DISCONTINUED | COMMUNITY
Start: 2022-01-11 | End: 2022-02-17

## 2022-02-17 NOTE — ASSESSMENT
[FreeTextEntry1] : Impression:\par RIGHT Pulsatile and Non-Pulsatile Tinnitus\par No Headaches or Vision Complaints\par Normal Hearing Tests with ENT per Patient\par \par MRI Head w/wo from [facility]: Unremarkable\par MRA Head TRICKS w/wo reveals no dural AVF or AVM, codominant transverse sinuses without significant stenosis, no jugular bulb diverticulum\par \par No cerebrovascular cause for pulsatile tinnitus identified.  Since her symptoms are much improved from when they started, I think she should continue to give it time and it may eventually resolve.  If it does not resolve or starts to get worse again she should be seen by ENT.  \par \par \par Plan:\par Follow Up with Me As Needed if Pulsatile Tinnitus gets worse

## 2022-02-17 NOTE — HISTORY OF PRESENT ILLNESS
[de-identified] : Ms. MARTINEZ is a pleasant 56 year old female who presents today for follow up of RIGHT ear pulsatile tinnitus and MRA review.  It first started in 4/2021, 2 weeks after the pfizer covid vaccine.  Since that time it has been getting progressively better.  It is described as a constant, whooshing sound that is NOT sync with her pulse.  Turning her head to the left side improves the sound.  She has been seen by ENT x3 in the past and had a normal hearing test, normal ear exam as per patient.  She denies any headaches, blurry vision or diplopia.  She saw the ophthalmologist 2 months ago and is having work up for glaucoma, otherwise normal.

## 2022-02-17 NOTE — REASON FOR VISIT
[Home] : at home, [unfilled] , at the time of the visit. [Medical Office: ([facility])___] : at the medical office located in  [Verbal consent obtained from patient] : the patient, [unfilled] [Follow-Up: _____] : a [unfilled] follow-up visit

## 2022-03-23 NOTE — PHYSICAL EXAM
[No Acute Distress] : no acute distress [Well Nourished] : well nourished [Normal Sclera/Conjunctiva] : normal sclera/conjunctiva [Normal Outer Ear/Nose] : the outer ears and nose were normal in appearance [Normal Oropharynx] : the oropharynx was normal [Normal TMs] : both tympanic membranes were normal [No Lymphadenopathy] : no lymphadenopathy [Supple] : supple [No Spinal Tenderness] : no spinal tenderness [No Focal Deficits] : no focal deficits [Normal Gait] : normal gait [Normal Affect] : the affect was normal [Alert and Oriented x3] : oriented to person, place, and time [Normal Insight/Judgement] : insight and judgment were intact [de-identified] : right paraspinal tenderness [de-identified] : neg straight leg raise Taltz Counseling: I discussed with the patient the risks of ixekizumab including but not limited to immunosuppression, serious infections, worsening of inflammatory bowel disease and drug reactions.  The patient understands that monitoring is required including a PPD at baseline and must alert us or the primary physician if symptoms of infection or other concerning signs are noted.

## 2022-05-10 ENCOUNTER — LABORATORY RESULT (OUTPATIENT)
Age: 57
End: 2022-05-10

## 2022-05-10 ENCOUNTER — APPOINTMENT (OUTPATIENT)
Dept: INTERNAL MEDICINE | Facility: CLINIC | Age: 57
End: 2022-05-10
Payer: COMMERCIAL

## 2022-05-10 ENCOUNTER — NON-APPOINTMENT (OUTPATIENT)
Age: 57
End: 2022-05-10

## 2022-05-10 VITALS
WEIGHT: 133 LBS | DIASTOLIC BLOOD PRESSURE: 80 MMHG | SYSTOLIC BLOOD PRESSURE: 130 MMHG | BODY MASS INDEX: 21.38 KG/M2 | HEIGHT: 66 IN

## 2022-05-10 DIAGNOSIS — R25.2 CRAMP AND SPASM: ICD-10-CM

## 2022-05-10 DIAGNOSIS — M62.838 OTHER MUSCLE SPASM: ICD-10-CM

## 2022-05-10 DIAGNOSIS — Z87.898 PERSONAL HISTORY OF OTHER SPECIFIED CONDITIONS: ICD-10-CM

## 2022-05-10 DIAGNOSIS — Z87.09 PERSONAL HISTORY OF OTHER DISEASES OF THE RESPIRATORY SYSTEM: ICD-10-CM

## 2022-05-10 PROCEDURE — 36415 COLL VENOUS BLD VENIPUNCTURE: CPT

## 2022-05-10 PROCEDURE — 99396 PREV VISIT EST AGE 40-64: CPT | Mod: 25

## 2022-05-10 NOTE — PLAN
[FreeTextEntry1] : Pt will further improve diet and exercise\par EKG NSR\par Recommend pt start Xyzal and Nasonex for the next 2-4 weeks to see if this helps with her sinus symptoms and Tinnitus

## 2022-05-10 NOTE — HEALTH RISK ASSESSMENT
[Good] : ~his/her~  mood as  good [No falls in past year] : Patient reported no falls in the past year [Patient reported mammogram was normal] : Patient reported mammogram was normal [Change in mental status noted] : No change in mental status noted [Language] : denies difficulty with language [Behavior] : denies difficulty with behavior [None] : None [With Significant Other] : lives with significant other [] :  [Sexually Active] : sexually active [Feels Safe at Home] : Feels safe at home [Fully functional (bathing, dressing, toileting, transferring, walking, feeding)] : Fully functional (bathing, dressing, toileting, transferring, walking, feeding) [Fully functional (using the telephone, shopping, preparing meals, housekeeping, doing laundry, using] : Fully functional and needs no help or supervision to perform IADLs (using the telephone, shopping, preparing meals, housekeeping, doing laundry, using transportation, managing medications and managing finances) [Reports changes in hearing] : Reports no changes in hearing [Reports changes in vision] : Reports no changes in vision [Reports normal functional visual acuity (ie: able to read med bottle)] : Reports normal functional visual acuity [MammogramDate] : 12/21/2021

## 2022-05-10 NOTE — HISTORY OF PRESENT ILLNESS
[FreeTextEntry1] : 56 year old female here today for her yearly checkup. [de-identified] : Pt presents to the office today for CPE and FBW\par She has been feeling well overall \par Her diet is fair has not been exercising as much as she had done in the past.\par She still suffers with intermittent sinus pressure and pain as well as tinnitus\par She has not used steroid nasal sprays or Xyzal for more then a few days\par She has seen her GYN in Aug and had her mammo done 12/21

## 2022-05-20 ENCOUNTER — NON-APPOINTMENT (OUTPATIENT)
Age: 57
End: 2022-05-20

## 2022-05-20 LAB
25(OH)D3 SERPL-MCNC: 25.1 NG/ML
ALBUMIN SERPL ELPH-MCNC: 4.7 G/DL
ALP BLD-CCNC: 85 U/L
ALT SERPL-CCNC: 28 U/L
ANION GAP SERPL CALC-SCNC: 13 MMOL/L
APPEARANCE: CLEAR
AST SERPL-CCNC: 26 U/L
BACTERIA: NEGATIVE
BASOPHILS # BLD AUTO: 0.03 K/UL
BASOPHILS NFR BLD AUTO: 0.9 %
BILIRUB SERPL-MCNC: 0.6 MG/DL
BILIRUBIN URINE: NEGATIVE
BLOOD URINE: NEGATIVE
BUN SERPL-MCNC: 8 MG/DL
CALCIUM SERPL-MCNC: 9.8 MG/DL
CHLORIDE SERPL-SCNC: 106 MMOL/L
CHOLEST SERPL-MCNC: 168 MG/DL
CO2 SERPL-SCNC: 26 MMOL/L
COLOR: NORMAL
CREAT SERPL-MCNC: 0.78 MG/DL
EGFR: 89 ML/MIN/1.73M2
EOSINOPHIL # BLD AUTO: 0 K/UL
EOSINOPHIL NFR BLD AUTO: 0 %
ESTIMATED AVERAGE GLUCOSE: 117 MG/DL
GLUCOSE QUALITATIVE U: NEGATIVE
GLUCOSE SERPL-MCNC: 58 MG/DL
HBA1C MFR BLD HPLC: 5.7 %
HCT VFR BLD CALC: 43 %
HDLC SERPL-MCNC: 96 MG/DL
HGB BLD-MCNC: 14.3 G/DL
HYALINE CASTS: 0 /LPF
KETONES URINE: NEGATIVE
LDLC SERPL CALC-MCNC: 64 MG/DL
LEUKOCYTE ESTERASE URINE: NEGATIVE
LYMPHOCYTES # BLD AUTO: 1.39 K/UL
LYMPHOCYTES NFR BLD AUTO: 47.3 %
MAGNESIUM SERPL-MCNC: 2.3 MG/DL
MAN DIFF?: NORMAL
MCHC RBC-ENTMCNC: 31 PG
MCHC RBC-ENTMCNC: 33.3 GM/DL
MCV RBC AUTO: 93.1 FL
MICROSCOPIC-UA: NORMAL
MONOCYTES # BLD AUTO: 0.26 K/UL
MONOCYTES NFR BLD AUTO: 8.8 %
NEUTROPHILS # BLD AUTO: 1.26 K/UL
NEUTROPHILS NFR BLD AUTO: 43 %
NITRITE URINE: NEGATIVE
NONHDLC SERPL-MCNC: 72 MG/DL
PH URINE: 8
PLATELET # BLD AUTO: 145 K/UL
POTASSIUM SERPL-SCNC: 4.4 MMOL/L
PROT SERPL-MCNC: 7.4 G/DL
PROTEIN URINE: NEGATIVE
RBC # BLD: 4.62 M/UL
RBC # FLD: 12.8 %
RED BLOOD CELLS URINE: 1 /HPF
SODIUM SERPL-SCNC: 144 MMOL/L
SPECIFIC GRAVITY URINE: 1.01
SQUAMOUS EPITHELIAL CELLS: 0 /HPF
T4 FREE SERPL-MCNC: 1.1 NG/DL
TRIGL SERPL-MCNC: 42 MG/DL
UROBILINOGEN URINE: NORMAL
VIT B12 SERPL-MCNC: 847 PG/ML
WBC # FLD AUTO: 2.93 K/UL
WHITE BLOOD CELLS URINE: 0 /HPF

## 2022-06-13 ENCOUNTER — RX RENEWAL (OUTPATIENT)
Age: 57
End: 2022-06-13

## 2022-07-05 ENCOUNTER — APPOINTMENT (OUTPATIENT)
Dept: ENDOCRINOLOGY | Facility: CLINIC | Age: 57
End: 2022-07-05

## 2022-07-05 VITALS
SYSTOLIC BLOOD PRESSURE: 146 MMHG | HEART RATE: 64 BPM | TEMPERATURE: 97.2 F | DIASTOLIC BLOOD PRESSURE: 90 MMHG | OXYGEN SATURATION: 95 % | BODY MASS INDEX: 21.85 KG/M2 | WEIGHT: 135.4 LBS

## 2022-07-05 PROCEDURE — 99213 OFFICE O/P EST LOW 20 MIN: CPT

## 2022-07-05 NOTE — HISTORY OF PRESENT ILLNESS
[FreeTextEntry1] : Patient is a 55-year-old woman here to establish care for elevated serum metanephrine level.\par \par Patient reported a history of hypertension in her late 30s.  She reported that she was started on a blood pressure medication, she does not recall the name of the medication. She was getting dark spots on her legs.  She went to her dermatologist, and thought that the rash is related to the medication side effects.  She was advised to stop her blood pressure medication.\par \par In her late 40s, she was restarted on her blood pressure medication due to consistently elevated blood pressure.  Likely she was started with enalapril 5 mg once daily.\par \par She also reported a history of arthritis.  She reported that she had bilateral knee swelling that required tapping of the joints and drainage of the fluids.\par \par She is currently taking losartan 50 mg once daily for blood pressure. .\par \par \par There's no family history of pheochromocytoma or MEN syndrome. \par \par Plasma metanephrines were checked and normetanephrine was elevated at 170.4 pg/ml (0-136)\par \par Repeat normetanephrine was done in June 2021 which is normal at 128 PG/mL\par Metanephrine plasma level was normal at 52.8 PG/mL.\par 24-hour urine for catecholamines were within normal range.\par Renin activity level was not suppressed in May 2021.\par

## 2022-07-05 NOTE — ASSESSMENT
[FreeTextEntry1] : 56-year-old woman with history of hypertension, presenting to endocrinology for evaluation of elevated plasma metanephrine level.\par \par 1.  Elevated plasma metanephrine level.\par She denies any family history of pheochromocytoma.\par Patient does not have the classical triad of pheochromocytoma including episodic headache, sweating and tachycardia.  She does have possibly paroxysmal elevation in blood pressure.\par She states that the recent tinnitus has been making her blood pressure worsening.\par I have low suspicions that this patient has pheochromocytoma.\par 24-hour catecholamine in the urine was within normal limits.\par Repeat metanephrine and normetanephrine in the plasma was normal in June 2021.\par We will repeat blood work today with plasma metanephrines.\par If within normal limit range.  No need to repeat.\par \par 2.  Mildly elevated Jose Maria and renin ratio\par I would recommend repeating aldosterone and renin activity level.\par Patient is currently on losartan once daily.\par Blood pressure is well controlled.\par \par If plasma metanephrine level is within normal range, no need to further follow-up with endocrinology.\par

## 2022-07-06 LAB — ALDOSTERONE SERUM: 8.3 NG/DL

## 2022-07-12 LAB
METANEPHRINE, PL: 19.9 PG/ML
NORMETANEPHRINE, PL: 106.7 PG/ML
RENIN ACTIVITY, PLASMA: 0.3 NG/ML/HR

## 2022-08-22 ENCOUNTER — NON-APPOINTMENT (OUTPATIENT)
Age: 57
End: 2022-08-22

## 2022-08-23 ENCOUNTER — APPOINTMENT (OUTPATIENT)
Dept: NEPHROLOGY | Facility: CLINIC | Age: 57
End: 2022-08-23

## 2022-08-23 VITALS
DIASTOLIC BLOOD PRESSURE: 83 MMHG | WEIGHT: 136.24 LBS | HEIGHT: 66 IN | OXYGEN SATURATION: 98 % | BODY MASS INDEX: 21.9 KG/M2 | TEMPERATURE: 97.3 F | SYSTOLIC BLOOD PRESSURE: 133 MMHG | HEART RATE: 71 BPM

## 2022-08-23 PROCEDURE — 99215 OFFICE O/P EST HI 40 MIN: CPT

## 2022-08-23 NOTE — PHYSICAL EXAM
[General Appearance - Alert] : alert [General Appearance - In No Acute Distress] : in no acute distress [Sclera] : the sclera and conjunctiva were normal [PERRL With Normal Accommodation] : pupils were equal in size, round, and reactive to light [Extraocular Movements] : extraocular movements were intact [Outer Ear] : the ears and nose were normal in appearance [Oropharynx] : the oropharynx was normal [Neck Appearance] : the appearance of the neck was normal [Neck Cervical Mass (___cm)] : no neck mass was observed [Jugular Venous Distention Increased] : there was no jugular-venous distention [Thyroid Diffuse Enlargement] : the thyroid was not enlarged [Thyroid Nodule] : there were no palpable thyroid nodules [Full Pulse] : the pedal pulses are present [Edema] : there was no peripheral edema [Bowel Sounds] : normal bowel sounds [Abdomen Soft] : soft [Abdomen Tenderness] : non-tender [Abdomen Mass (___ Cm)] : no abdominal mass palpated [Cervical Lymph Nodes Enlarged Posterior Bilaterally] : posterior cervical [Cervical Lymph Nodes Enlarged Anterior Bilaterally] : anterior cervical [Supraclavicular Lymph Nodes Enlarged Bilaterally] : supraclavicular [No CVA Tenderness] : no ~M costovertebral angle tenderness [No Spinal Tenderness] : no spinal tenderness [Abnormal Walk] : normal gait [Nail Clubbing] : no clubbing  or cyanosis of the fingernails [Musculoskeletal - Swelling] : no joint swelling seen [Motor Tone] : muscle strength and tone were normal [Skin Color & Pigmentation] : normal skin color and pigmentation [Skin Turgor] : normal skin turgor [] : no rash [Deep Tendon Reflexes (DTR)] : deep tendon reflexes were 2+ and symmetric [Sensation] : the sensory exam was normal to light touch and pinprick [No Focal Deficits] : no focal deficits [Oriented To Time, Place, And Person] : oriented to person, place, and time [Impaired Insight] : insight and judgment were intact [Affect] : the affect was normal

## 2022-09-06 ENCOUNTER — RESULT REVIEW (OUTPATIENT)
Age: 57
End: 2022-09-06

## 2022-09-06 ENCOUNTER — RX RENEWAL (OUTPATIENT)
Age: 57
End: 2022-09-06

## 2022-11-08 ENCOUNTER — APPOINTMENT (OUTPATIENT)
Dept: INTERNAL MEDICINE | Facility: CLINIC | Age: 57
End: 2022-11-08

## 2022-11-17 ENCOUNTER — RESULT REVIEW (OUTPATIENT)
Age: 57
End: 2022-11-17

## 2023-01-06 ENCOUNTER — RX RENEWAL (OUTPATIENT)
Age: 58
End: 2023-01-06

## 2023-01-17 ENCOUNTER — OUTPATIENT (OUTPATIENT)
Dept: OUTPATIENT SERVICES | Facility: HOSPITAL | Age: 58
LOS: 1 days | End: 2023-01-17
Payer: COMMERCIAL

## 2023-01-17 ENCOUNTER — APPOINTMENT (OUTPATIENT)
Dept: MAMMOGRAPHY | Facility: CLINIC | Age: 58
End: 2023-01-17
Payer: COMMERCIAL

## 2023-01-17 ENCOUNTER — APPOINTMENT (OUTPATIENT)
Dept: ULTRASOUND IMAGING | Facility: CLINIC | Age: 58
End: 2023-01-17
Payer: COMMERCIAL

## 2023-01-17 DIAGNOSIS — Z00.8 ENCOUNTER FOR OTHER GENERAL EXAMINATION: ICD-10-CM

## 2023-01-17 PROCEDURE — 77067 SCR MAMMO BI INCL CAD: CPT

## 2023-01-17 PROCEDURE — 77063 BREAST TOMOSYNTHESIS BI: CPT | Mod: 26

## 2023-01-17 PROCEDURE — 77067 SCR MAMMO BI INCL CAD: CPT | Mod: 26

## 2023-01-17 PROCEDURE — 76641 ULTRASOUND BREAST COMPLETE: CPT | Mod: 26,50

## 2023-01-17 PROCEDURE — 76641 ULTRASOUND BREAST COMPLETE: CPT

## 2023-01-17 PROCEDURE — 77063 BREAST TOMOSYNTHESIS BI: CPT

## 2023-01-18 ENCOUNTER — APPOINTMENT (OUTPATIENT)
Dept: INTERNAL MEDICINE | Facility: CLINIC | Age: 58
End: 2023-01-18
Payer: COMMERCIAL

## 2023-01-18 VITALS
OXYGEN SATURATION: 96 % | TEMPERATURE: 98.7 F | HEART RATE: 103 BPM | BODY MASS INDEX: 23.46 KG/M2 | DIASTOLIC BLOOD PRESSURE: 98 MMHG | SYSTOLIC BLOOD PRESSURE: 160 MMHG | WEIGHT: 146 LBS | HEIGHT: 66 IN

## 2023-01-18 PROCEDURE — 99213 OFFICE O/P EST LOW 20 MIN: CPT | Mod: 25

## 2023-01-18 PROCEDURE — 36415 COLL VENOUS BLD VENIPUNCTURE: CPT

## 2023-01-18 NOTE — HISTORY OF PRESENT ILLNESS
[FreeTextEntry1] : 56 y/o female presents to the office today for a f/u visit and BP check.  [de-identified] : Pt presents to the office today for follow up and FBW\par She has been seeing eye Dr recently for increased eye pressures\par She has seen hematologist and rheumatologist recently as well \par No consults letters received

## 2023-01-18 NOTE — PLAN
[FreeTextEntry1] : Pts BP has been running elevated  the last few months\par She has been having more salt in diet as she has been going out to eat often \par Low sodium diet\par Increase losartan to 100mg daily

## 2023-01-29 ENCOUNTER — TRANSCRIPTION ENCOUNTER (OUTPATIENT)
Age: 58
End: 2023-01-29

## 2023-01-29 LAB
25(OH)D3 SERPL-MCNC: 41.9 NG/ML
ALBUMIN SERPL ELPH-MCNC: 4.6 G/DL
ALP BLD-CCNC: 84 U/L
ALT SERPL-CCNC: 36 U/L
ANION GAP SERPL CALC-SCNC: 9 MMOL/L
AST SERPL-CCNC: 29 U/L
BASOPHILS # BLD AUTO: 0.06 K/UL
BASOPHILS NFR BLD AUTO: 1.7 %
BILIRUB SERPL-MCNC: 0.8 MG/DL
BUN SERPL-MCNC: 9 MG/DL
CALCIUM SERPL-MCNC: 9.9 MG/DL
CHLORIDE SERPL-SCNC: 106 MMOL/L
CHOLEST SERPL-MCNC: 201 MG/DL
CO2 SERPL-SCNC: 28 MMOL/L
CREAT SERPL-MCNC: 0.8 MG/DL
EGFR: 85 ML/MIN/1.73M2
EOSINOPHIL # BLD AUTO: 0.05 K/UL
EOSINOPHIL NFR BLD AUTO: 1.4 %
ESTIMATED AVERAGE GLUCOSE: 120 MG/DL
GLUCOSE SERPL-MCNC: 90 MG/DL
HBA1C MFR BLD HPLC: 5.8 %
HCT VFR BLD CALC: 41.4 %
HDLC SERPL-MCNC: 91 MG/DL
HGB BLD-MCNC: 13.6 G/DL
IMM GRANULOCYTES NFR BLD AUTO: 0 %
LDLC SERPL CALC-MCNC: 99 MG/DL
LYMPHOCYTES # BLD AUTO: 1.64 K/UL
LYMPHOCYTES NFR BLD AUTO: 46.1 %
MAN DIFF?: NORMAL
MCHC RBC-ENTMCNC: 30.3 PG
MCHC RBC-ENTMCNC: 32.9 GM/DL
MCV RBC AUTO: 92.2 FL
MONOCYTES # BLD AUTO: 0.36 K/UL
MONOCYTES NFR BLD AUTO: 10.1 %
NEUTROPHILS # BLD AUTO: 1.45 K/UL
NEUTROPHILS NFR BLD AUTO: 40.7 %
NONHDLC SERPL-MCNC: 110 MG/DL
PLATELET # BLD AUTO: 161 K/UL
POTASSIUM SERPL-SCNC: 4.2 MMOL/L
PROT SERPL-MCNC: 7.4 G/DL
RBC # BLD: 4.49 M/UL
RBC # FLD: 12.7 %
SODIUM SERPL-SCNC: 143 MMOL/L
TRIGL SERPL-MCNC: 55 MG/DL
WBC # FLD AUTO: 3.56 K/UL

## 2023-01-31 ENCOUNTER — RX RENEWAL (OUTPATIENT)
Age: 58
End: 2023-01-31

## 2023-02-01 ENCOUNTER — NON-APPOINTMENT (OUTPATIENT)
Age: 58
End: 2023-02-01

## 2023-02-03 ENCOUNTER — OUTPATIENT (OUTPATIENT)
Dept: OUTPATIENT SERVICES | Facility: HOSPITAL | Age: 58
LOS: 1 days | End: 2023-02-03
Payer: COMMERCIAL

## 2023-02-03 ENCOUNTER — APPOINTMENT (OUTPATIENT)
Dept: UROLOGY | Facility: CLINIC | Age: 58
End: 2023-02-03
Payer: COMMERCIAL

## 2023-02-03 DIAGNOSIS — M54.50 LOW BACK PAIN, UNSPECIFIED: ICD-10-CM

## 2023-02-03 PROCEDURE — 76775 US EXAM ABDO BACK WALL LIM: CPT

## 2023-02-03 PROCEDURE — 99213 OFFICE O/P EST LOW 20 MIN: CPT

## 2023-02-03 PROCEDURE — 76775 US EXAM ABDO BACK WALL LIM: CPT | Mod: 26

## 2023-02-03 NOTE — PHYSICAL EXAM
[General Appearance - Well Developed] : well developed [General Appearance - Well Nourished] : well nourished [Skin Color & Pigmentation] : normal skin color and pigmentation

## 2023-02-03 NOTE — HISTORY OF PRESENT ILLNESS
[FreeTextEntry1] : Seen in the past for microscopic hematuria . work up was negative in 2017 \par recently diagnosed with Lupus . Liver enzymes are elevated so PCP did an abd ultrasound She was told that the kidneys do not look normal and one id bigger than the other . Repeat ultrasound she states is normal \par She is anxious about this so she is here for another ultrasound

## 2023-02-06 DIAGNOSIS — M54.50 LOW BACK PAIN, UNSPECIFIED: ICD-10-CM

## 2023-02-07 ENCOUNTER — TRANSCRIPTION ENCOUNTER (OUTPATIENT)
Age: 58
End: 2023-02-07

## 2023-02-07 ENCOUNTER — APPOINTMENT (OUTPATIENT)
Dept: GASTROENTEROLOGY | Facility: CLINIC | Age: 58
End: 2023-02-07

## 2023-02-08 ENCOUNTER — APPOINTMENT (OUTPATIENT)
Dept: INTERNAL MEDICINE | Facility: CLINIC | Age: 58
End: 2023-02-08
Payer: COMMERCIAL

## 2023-02-08 VITALS
OXYGEN SATURATION: 98 % | DIASTOLIC BLOOD PRESSURE: 90 MMHG | WEIGHT: 146 LBS | BODY MASS INDEX: 23.46 KG/M2 | TEMPERATURE: 98.6 F | HEIGHT: 66 IN | HEART RATE: 83 BPM | SYSTOLIC BLOOD PRESSURE: 168 MMHG

## 2023-02-08 DIAGNOSIS — R09.89 OTHER SPECIFIED SYMPTOMS AND SIGNS INVOLVING THE CIRCULATORY AND RESPIRATORY SYSTEMS: ICD-10-CM

## 2023-02-08 PROCEDURE — 99213 OFFICE O/P EST LOW 20 MIN: CPT

## 2023-02-08 NOTE — PHYSICAL EXAM
[Normal] : no posterior cervical lymphadenopathy and no anterior cervical lymphadenopathy [de-identified] : Nasal mucosa erythema and swelling

## 2023-02-08 NOTE — PLAN
[FreeTextEntry1] : Patient will start Nasonex over-the-counter\par Increase fluids and rest\par Viral PCR done in office today will be advised to result

## 2023-02-08 NOTE — HISTORY OF PRESENT ILLNESS
[FreeTextEntry8] : 56 y/o female presents to the office today with a sinus ingestion runny and stuffy nose\par Has been occurring for the last few days no fevers no chills

## 2023-02-09 ENCOUNTER — TRANSCRIPTION ENCOUNTER (OUTPATIENT)
Age: 58
End: 2023-02-09

## 2023-02-09 LAB
CORONAVIRUS (229E,HKU1,NL63,OC43): DETECTED
RAPID RVP RESULT: DETECTED
SARS-COV-2 RNA PNL RESP NAA+PROBE: NOT DETECTED

## 2023-02-15 ENCOUNTER — TRANSCRIPTION ENCOUNTER (OUTPATIENT)
Age: 58
End: 2023-02-15

## 2023-02-15 RX ORDER — LOSARTAN POTASSIUM 100 MG/1
100 TABLET, FILM COATED ORAL
Qty: 90 | Refills: 1 | Status: COMPLETED | COMMUNITY
Start: 2021-06-21 | End: 2023-02-15

## 2023-03-07 NOTE — ED PROVIDER NOTE - NS_ACPWITHSCRIBE_ED_ALL_ED
"I personally performed the services described in the documentation  recorded by the scribe in my presence, and it accurately and completely records my words and action.” Isotretinoin Pregnancy And Lactation Text: This medication is Pregnancy Category X and is considered extremely dangerous during pregnancy. It is unknown if it is excreted in breast milk.

## 2023-05-03 ENCOUNTER — APPOINTMENT (OUTPATIENT)
Dept: INTERNAL MEDICINE | Facility: CLINIC | Age: 58
End: 2023-05-03

## 2023-05-15 ENCOUNTER — RX RENEWAL (OUTPATIENT)
Age: 58
End: 2023-05-15

## 2023-05-18 ENCOUNTER — APPOINTMENT (OUTPATIENT)
Dept: INTERNAL MEDICINE | Facility: CLINIC | Age: 58
End: 2023-05-18
Payer: COMMERCIAL

## 2023-05-18 ENCOUNTER — NON-APPOINTMENT (OUTPATIENT)
Age: 58
End: 2023-05-18

## 2023-05-18 VITALS
HEIGHT: 66 IN | WEIGHT: 140 LBS | SYSTOLIC BLOOD PRESSURE: 120 MMHG | TEMPERATURE: 97.9 F | HEART RATE: 82 BPM | OXYGEN SATURATION: 98 % | DIASTOLIC BLOOD PRESSURE: 80 MMHG | BODY MASS INDEX: 22.5 KG/M2

## 2023-05-18 PROCEDURE — 99396 PREV VISIT EST AGE 40-64: CPT | Mod: 25

## 2023-05-18 PROCEDURE — 93000 ELECTROCARDIOGRAM COMPLETE: CPT

## 2023-05-18 PROCEDURE — 36415 COLL VENOUS BLD VENIPUNCTURE: CPT

## 2023-05-18 RX ORDER — HYDROXYCHLOROQUINE SULFATE 400 MG/1
TABLET ORAL
Refills: 0 | Status: ACTIVE | COMMUNITY

## 2023-05-18 NOTE — HEALTH RISK ASSESSMENT
[Good] : ~his/her~  mood as  good [No falls in past year] : Patient reported no falls in the past year [0] : 2) Feeling down, depressed, or hopeless: Not at all (0) [PHQ-2 Negative - No further assessment needed] : PHQ-2 Negative - No further assessment needed [[id number]] : 0 [Change in mental status noted] : No change in mental status noted [Language] : denies difficulty with language [Behavior] : denies difficulty with behavior [None] : None [With Significant Other] : lives with significant other [] :  [Fully functional (bathing, dressing, toileting, transferring, walking, feeding)] : Fully functional (bathing, dressing, toileting, transferring, walking, feeding) [Fully functional (using the telephone, shopping, preparing meals, housekeeping, doing laundry, using] : Fully functional and needs no help or supervision to perform IADLs (using the telephone, shopping, preparing meals, housekeeping, doing laundry, using transportation, managing medications and managing finances) [Reports changes in hearing] : Reports no changes in hearing [Reports changes in vision] : Reports no changes in vision [Reports changes in dental health] : Reports no changes in dental health

## 2023-05-18 NOTE — PLAN
[FreeTextEntry1] : FBW done in office today\par Continue follow ups with specialist\par Colonoscopy up to date\par Will get current mammo from this year

## 2023-05-18 NOTE — HISTORY OF PRESENT ILLNESS
[FreeTextEntry1] : 58 y/o female presents to the office today for a physical exam with fasting labs  [de-identified] : Pt presents to the office today for CPE and FBW\par Pt has been doing well \par She has been taking hydroxyquinoline 200mg now.  She has bee getting a cough after taking medication after dinner

## 2023-06-08 ENCOUNTER — TRANSCRIPTION ENCOUNTER (OUTPATIENT)
Age: 58
End: 2023-06-08

## 2023-06-08 DIAGNOSIS — R82.90 UNSPECIFIED ABNORMAL FINDINGS IN URINE: ICD-10-CM

## 2023-06-08 LAB
25(OH)D3 SERPL-MCNC: 50.5 NG/ML
ALBUMIN SERPL ELPH-MCNC: 4.2 G/DL
ALP BLD-CCNC: 78 U/L
ALT SERPL-CCNC: 21 U/L
ANION GAP SERPL CALC-SCNC: 11 MMOL/L
APPEARANCE: CLEAR
AST SERPL-CCNC: 21 U/L
BACTERIA: NEGATIVE /HPF
BILIRUB SERPL-MCNC: 0.4 MG/DL
BILIRUBIN URINE: NEGATIVE
BLOOD URINE: ABNORMAL
BUN SERPL-MCNC: 14 MG/DL
CALCIUM SERPL-MCNC: 10.1 MG/DL
CAST: 0 /LPF
CHLORIDE SERPL-SCNC: 104 MMOL/L
CHOLEST SERPL-MCNC: 147 MG/DL
CO2 SERPL-SCNC: 28 MMOL/L
COLOR: YELLOW
CREAT SERPL-MCNC: 0.76 MG/DL
EGFR: 91 ML/MIN/1.73M2
EPITHELIAL CELLS: 0 /HPF
ESTIMATED AVERAGE GLUCOSE: 120 MG/DL
GLUCOSE QUALITATIVE U: NEGATIVE MG/DL
GLUCOSE SERPL-MCNC: 87 MG/DL
HBA1C MFR BLD HPLC: 5.8 %
HDLC SERPL-MCNC: 71 MG/DL
KETONES URINE: NEGATIVE MG/DL
LDLC SERPL CALC-MCNC: 67 MG/DL
LEUKOCYTE ESTERASE URINE: NEGATIVE
MICROSCOPIC-UA: NORMAL
NITRITE URINE: NEGATIVE
NONHDLC SERPL-MCNC: 76 MG/DL
PH URINE: 5.5
POTASSIUM SERPL-SCNC: 3.9 MMOL/L
PROT SERPL-MCNC: 7.4 G/DL
PROTEIN URINE: NEGATIVE MG/DL
RED BLOOD CELLS URINE: 3 /HPF
SODIUM SERPL-SCNC: 143 MMOL/L
SPECIFIC GRAVITY URINE: 1.01
T4 FREE SERPL-MCNC: 1.2 NG/DL
TRIGL SERPL-MCNC: 46 MG/DL
TSH SERPL-ACNC: 1.72 UIU/ML
UROBILINOGEN URINE: 0.2 MG/DL
VIT B12 SERPL-MCNC: 863 PG/ML
WHITE BLOOD CELLS URINE: 0 /HPF

## 2023-06-12 ENCOUNTER — INPATIENT (INPATIENT)
Facility: HOSPITAL | Age: 58
LOS: 1 days | Discharge: HOME | End: 2023-06-14
Attending: INTERNAL MEDICINE | Admitting: INTERNAL MEDICINE
Payer: COMMERCIAL

## 2023-06-12 VITALS
RESPIRATION RATE: 14 BRPM | DIASTOLIC BLOOD PRESSURE: 73 MMHG | TEMPERATURE: 98 F | SYSTOLIC BLOOD PRESSURE: 117 MMHG | HEART RATE: 75 BPM | OXYGEN SATURATION: 100 %

## 2023-06-12 DIAGNOSIS — R55 SYNCOPE AND COLLAPSE: ICD-10-CM

## 2023-06-12 DIAGNOSIS — Z29.9 ENCOUNTER FOR PROPHYLACTIC MEASURES, UNSPECIFIED: ICD-10-CM

## 2023-06-12 DIAGNOSIS — M32.9 SYSTEMIC LUPUS ERYTHEMATOSUS, UNSPECIFIED: ICD-10-CM

## 2023-06-12 DIAGNOSIS — M06.9 RHEUMATOID ARTHRITIS, UNSPECIFIED: ICD-10-CM

## 2023-06-12 DIAGNOSIS — M79.604 PAIN IN RIGHT LEG: ICD-10-CM

## 2023-06-12 LAB
A1C WITH ESTIMATED AVERAGE GLUCOSE RESULT: 5.7 % — HIGH (ref 4–5.6)
ALBUMIN SERPL ELPH-MCNC: 4.3 G/DL — SIGNIFICANT CHANGE UP (ref 3.3–5)
ALP SERPL-CCNC: 69 U/L — SIGNIFICANT CHANGE UP (ref 40–120)
ALT FLD-CCNC: 28 U/L — SIGNIFICANT CHANGE UP (ref 4–33)
ANION GAP SERPL CALC-SCNC: 9 MMOL/L — SIGNIFICANT CHANGE UP (ref 7–14)
APPEARANCE UR: CLEAR — SIGNIFICANT CHANGE UP
AST SERPL-CCNC: 25 U/L — SIGNIFICANT CHANGE UP (ref 4–32)
BASOPHILS # BLD AUTO: 0.04 K/UL — SIGNIFICANT CHANGE UP (ref 0–0.2)
BASOPHILS NFR BLD AUTO: 0.5 % — SIGNIFICANT CHANGE UP (ref 0–2)
BILIRUB SERPL-MCNC: 0.4 MG/DL — SIGNIFICANT CHANGE UP (ref 0.2–1.2)
BILIRUB UR-MCNC: NEGATIVE — SIGNIFICANT CHANGE UP
BLOOD GAS VENOUS COMPREHENSIVE RESULT: SIGNIFICANT CHANGE UP
BUN SERPL-MCNC: 17 MG/DL — SIGNIFICANT CHANGE UP (ref 7–23)
CALCIUM SERPL-MCNC: 9.5 MG/DL — SIGNIFICANT CHANGE UP (ref 8.4–10.5)
CHLORIDE SERPL-SCNC: 101 MMOL/L — SIGNIFICANT CHANGE UP (ref 98–107)
CK MB BLD-MCNC: 0.6 % — SIGNIFICANT CHANGE UP (ref 0–2.5)
CK MB CFR SERPL CALC: 1.4 NG/ML — SIGNIFICANT CHANGE UP
CK SERPL-CCNC: 245 U/L — HIGH (ref 25–170)
CO2 SERPL-SCNC: 30 MMOL/L — SIGNIFICANT CHANGE UP (ref 22–31)
COLOR SPEC: SIGNIFICANT CHANGE UP
CREAT SERPL-MCNC: 0.89 MG/DL — SIGNIFICANT CHANGE UP (ref 0.5–1.3)
D DIMER BLD IA.RAPID-MCNC: <150 NG/ML DDU — SIGNIFICANT CHANGE UP
DIFF PNL FLD: NEGATIVE — SIGNIFICANT CHANGE UP
EGFR: 75 ML/MIN/1.73M2 — SIGNIFICANT CHANGE UP
EOSINOPHIL # BLD AUTO: 0.04 K/UL — SIGNIFICANT CHANGE UP (ref 0–0.5)
EOSINOPHIL NFR BLD AUTO: 0.5 % — SIGNIFICANT CHANGE UP (ref 0–6)
ESTIMATED AVERAGE GLUCOSE: 117 — SIGNIFICANT CHANGE UP
GLUCOSE SERPL-MCNC: 108 MG/DL — HIGH (ref 70–99)
GLUCOSE UR QL: NEGATIVE — SIGNIFICANT CHANGE UP
HCT VFR BLD CALC: 40.8 % — SIGNIFICANT CHANGE UP (ref 34.5–45)
HGB BLD-MCNC: 13.7 G/DL — SIGNIFICANT CHANGE UP (ref 11.5–15.5)
IANC: 5.62 K/UL — SIGNIFICANT CHANGE UP (ref 1.8–7.4)
IMM GRANULOCYTES NFR BLD AUTO: 0.1 % — SIGNIFICANT CHANGE UP (ref 0–0.9)
KETONES UR-MCNC: NEGATIVE — SIGNIFICANT CHANGE UP
LEUKOCYTE ESTERASE UR-ACNC: NEGATIVE — SIGNIFICANT CHANGE UP
LIDOCAIN IGE QN: 30 U/L — SIGNIFICANT CHANGE UP (ref 7–60)
LYMPHOCYTES # BLD AUTO: 1.06 K/UL — SIGNIFICANT CHANGE UP (ref 1–3.3)
LYMPHOCYTES # BLD AUTO: 14.2 % — SIGNIFICANT CHANGE UP (ref 13–44)
MCHC RBC-ENTMCNC: 29.7 PG — SIGNIFICANT CHANGE UP (ref 27–34)
MCHC RBC-ENTMCNC: 33.6 GM/DL — SIGNIFICANT CHANGE UP (ref 32–36)
MCV RBC AUTO: 88.3 FL — SIGNIFICANT CHANGE UP (ref 80–100)
MONOCYTES # BLD AUTO: 0.72 K/UL — SIGNIFICANT CHANGE UP (ref 0–0.9)
MONOCYTES NFR BLD AUTO: 9.6 % — SIGNIFICANT CHANGE UP (ref 2–14)
NEUTROPHILS # BLD AUTO: 5.62 K/UL — SIGNIFICANT CHANGE UP (ref 1.8–7.4)
NEUTROPHILS NFR BLD AUTO: 75.1 % — SIGNIFICANT CHANGE UP (ref 43–77)
NITRITE UR-MCNC: NEGATIVE — SIGNIFICANT CHANGE UP
NRBC # BLD: 0 /100 WBCS — SIGNIFICANT CHANGE UP (ref 0–0)
NRBC # FLD: 0 K/UL — SIGNIFICANT CHANGE UP (ref 0–0)
PH UR: 7.5 — SIGNIFICANT CHANGE UP (ref 5–8)
PLATELET # BLD AUTO: 174 K/UL — SIGNIFICANT CHANGE UP (ref 150–400)
POTASSIUM SERPL-MCNC: 3.5 MMOL/L — SIGNIFICANT CHANGE UP (ref 3.5–5.3)
POTASSIUM SERPL-SCNC: 3.5 MMOL/L — SIGNIFICANT CHANGE UP (ref 3.5–5.3)
PROT SERPL-MCNC: 7.2 G/DL — SIGNIFICANT CHANGE UP (ref 6–8.3)
PROT UR-MCNC: ABNORMAL
RBC # BLD: 4.62 M/UL — SIGNIFICANT CHANGE UP (ref 3.8–5.2)
RBC # FLD: 13 % — SIGNIFICANT CHANGE UP (ref 10.3–14.5)
SODIUM SERPL-SCNC: 140 MMOL/L — SIGNIFICANT CHANGE UP (ref 135–145)
SP GR SPEC: 1.02 — SIGNIFICANT CHANGE UP (ref 1.01–1.05)
TROPONIN T, HIGH SENSITIVITY RESULT: <6 NG/L — SIGNIFICANT CHANGE UP
TSH SERPL-MCNC: 1.25 UIU/ML — SIGNIFICANT CHANGE UP (ref 0.27–4.2)
UROBILINOGEN FLD QL: SIGNIFICANT CHANGE UP
WBC # BLD: 7.49 K/UL — SIGNIFICANT CHANGE UP (ref 3.8–10.5)
WBC # FLD AUTO: 7.49 K/UL — SIGNIFICANT CHANGE UP (ref 3.8–10.5)

## 2023-06-12 PROCEDURE — 99285 EMERGENCY DEPT VISIT HI MDM: CPT

## 2023-06-12 PROCEDURE — 93971 EXTREMITY STUDY: CPT | Mod: 26,LT

## 2023-06-12 PROCEDURE — 93306 TTE W/DOPPLER COMPLETE: CPT | Mod: 26

## 2023-06-12 PROCEDURE — 70450 CT HEAD/BRAIN W/O DYE: CPT | Mod: 26

## 2023-06-12 PROCEDURE — 99223 1ST HOSP IP/OBS HIGH 75: CPT

## 2023-06-12 PROCEDURE — 73502 X-RAY EXAM HIP UNI 2-3 VIEWS: CPT | Mod: 26,RT

## 2023-06-12 RX ORDER — MORPHINE SULFATE 50 MG/1
4 CAPSULE, EXTENDED RELEASE ORAL ONCE
Refills: 0 | Status: DISCONTINUED | OUTPATIENT
Start: 2023-06-12 | End: 2023-06-12

## 2023-06-12 RX ORDER — POLYETHYLENE GLYCOL 3350 17 G/17G
17 POWDER, FOR SOLUTION ORAL DAILY
Refills: 0 | Status: DISCONTINUED | OUTPATIENT
Start: 2023-06-12 | End: 2023-06-14

## 2023-06-12 RX ORDER — NALOXONE HYDROCHLORIDE 4 MG/.1ML
0.4 SPRAY NASAL ONCE
Refills: 0 | Status: DISCONTINUED | OUTPATIENT
Start: 2023-06-12 | End: 2023-06-14

## 2023-06-12 RX ORDER — HYDROXYCHLOROQUINE SULFATE 200 MG
0 TABLET ORAL
Qty: 0 | Refills: 3 | DISCHARGE

## 2023-06-12 RX ORDER — SODIUM CHLORIDE 9 MG/ML
1000 INJECTION, SOLUTION INTRAVENOUS ONCE
Refills: 0 | Status: COMPLETED | OUTPATIENT
Start: 2023-06-12 | End: 2023-06-12

## 2023-06-12 RX ORDER — ONDANSETRON 8 MG/1
4 TABLET, FILM COATED ORAL EVERY 8 HOURS
Refills: 0 | Status: DISCONTINUED | OUTPATIENT
Start: 2023-06-12 | End: 2023-06-14

## 2023-06-12 RX ORDER — HYDROXYCHLOROQUINE SULFATE 200 MG
200 TABLET ORAL DAILY
Refills: 0 | Status: DISCONTINUED | OUTPATIENT
Start: 2023-06-12 | End: 2023-06-13

## 2023-06-12 RX ORDER — ACETAMINOPHEN 500 MG
650 TABLET ORAL EVERY 6 HOURS
Refills: 0 | Status: DISCONTINUED | OUTPATIENT
Start: 2023-06-12 | End: 2023-06-14

## 2023-06-12 RX ORDER — OXYCODONE HYDROCHLORIDE 5 MG/1
2.5 TABLET ORAL EVERY 4 HOURS
Refills: 0 | Status: DISCONTINUED | OUTPATIENT
Start: 2023-06-12 | End: 2023-06-14

## 2023-06-12 RX ORDER — LANOLIN ALCOHOL/MO/W.PET/CERES
3 CREAM (GRAM) TOPICAL AT BEDTIME
Refills: 0 | Status: DISCONTINUED | OUTPATIENT
Start: 2023-06-12 | End: 2023-06-14

## 2023-06-12 RX ORDER — SENNA PLUS 8.6 MG/1
2 TABLET ORAL AT BEDTIME
Refills: 0 | Status: DISCONTINUED | OUTPATIENT
Start: 2023-06-12 | End: 2023-06-14

## 2023-06-12 RX ORDER — OXYCODONE HYDROCHLORIDE 5 MG/1
5 TABLET ORAL EVERY 4 HOURS
Refills: 0 | Status: DISCONTINUED | OUTPATIENT
Start: 2023-06-12 | End: 2023-06-14

## 2023-06-12 RX ORDER — CYCLOBENZAPRINE HYDROCHLORIDE 10 MG/1
5 TABLET, FILM COATED ORAL EVERY 8 HOURS
Refills: 0 | Status: DISCONTINUED | OUTPATIENT
Start: 2023-06-12 | End: 2023-06-14

## 2023-06-12 RX ADMIN — MORPHINE SULFATE 4 MILLIGRAM(S): 50 CAPSULE, EXTENDED RELEASE ORAL at 02:05

## 2023-06-12 RX ADMIN — MORPHINE SULFATE 4 MILLIGRAM(S): 50 CAPSULE, EXTENDED RELEASE ORAL at 06:04

## 2023-06-12 RX ADMIN — Medication 200 MILLIGRAM(S): at 17:56

## 2023-06-12 RX ADMIN — SODIUM CHLORIDE 1000 MILLILITER(S): 9 INJECTION, SOLUTION INTRAVENOUS at 03:41

## 2023-06-12 RX ADMIN — SODIUM CHLORIDE 1000 MILLILITER(S): 9 INJECTION, SOLUTION INTRAVENOUS at 05:30

## 2023-06-12 NOTE — H&P ADULT - ASSESSMENT
58 y/o M with pmhx of HTN, Lupus, RA who presents for syncopal episode last night. Admitted to medicine for further workup

## 2023-06-12 NOTE — ED PROVIDER NOTE - CLINICAL SUMMARY MEDICAL DECISION MAKING FREE TEXT BOX
Ddx not limited to MSK pain, DVT, nephrolithiasis, UTI, arthritis. Plan to get basic labs, cardiac labs, EKG, chest xray D-dimer and Troponin. Dispo pending labs, imaging and reassessment.

## 2023-06-12 NOTE — H&P ADULT - NSHPSOCIALHISTORY_GEN_ALL_CORE
Denies any ETOH use, tobacco or illicit drug use  Lives with , lives active life-style rides over 15 miles on bike  Travels between [place] and [place] regularly.

## 2023-06-12 NOTE — H&P ADULT - NSHPREVIEWOFSYSTEMS_GEN_ALL_CORE
REVIEW OF SYSTEMS:  CONSTITUTIONAL: No fever, weight loss, or fatigue  EYES: No eye pain, visual disturbances, or discharge  ENMT:  No difficulty hearing, tinnitus, vertigo; No sinus or throat pain  NECK: No pain or stiffness  BREASTS: No pain, masses, or nipple discharge  RESPIRATORY: No cough, wheezing, chills or hemoptysis; No shortness of breath  CARDIOVASCULAR: No chest pain, palpitations, dizziness, or leg swelling  GASTROINTESTINAL: No abdominal or epigastric pain. No nausea, vomiting, or hematemesis; No diarrhea or constipation. No melena or hematochezia.  GENITOURINARY: No dysuria, frequency, hematuria, or incontinence  NEUROLOGICAL: No headaches, memory loss, loss of strength, numbness, or tremors  ENDOCRINE: No heat or cold intolerance; No hair loss  MUSCULOSKELETAL: No joint pain or swelling; No muscle, back, or extremity pain  PSYCHIATRIC: No depression, anxiety, mood swings, or difficulty sleeping  HEME/LYMPH: No easy bruising, or bleeding gums  ALLERY AND IMMUNOLOGIC: No hives or eczema REVIEW OF SYSTEMS:  CONSTITUTIONAL: +dizziness, No fever, weight loss, or fatigue  EYES: No eye pain, visual disturbances, or discharge  ENMT:  No difficulty hearing, tinnitus, vertigo; No sinus or throat pain  NECK: No pain or stiffness  BREASTS: No pain, masses, or nipple discharge  RESPIRATORY: No cough, wheezing, chills or hemoptysis; No shortness of breath  CARDIOVASCULAR: No chest pain, palpitations, dizziness, or leg swelling  GASTROINTESTINAL: No abdominal or epigastric pain. No nausea, vomiting, or hematemesis; No diarrhea or constipation. No melena or hematochezia.  GENITOURINARY: No dysuria, frequency, hematuria, or incontinence  NEUROLOGICAL: No headaches, memory loss, loss of strength, numbness, or tremors  ENDOCRINE: No heat or cold intolerance; No hair loss  MUSCULOSKELETAL: No joint pain or swelling; + right leg pain   PSYCHIATRIC: No depression, anxiety, mood swings, or difficulty sleeping  HEME/LYMPH: No easy bruising, or bleeding gums  ALLERY AND IMMUNOLOGIC: No hives or eczema

## 2023-06-12 NOTE — ED PROVIDER NOTE - ATTENDING CONTRIBUTION TO CARE
Gong: I have seen and examined the patient face to face, have reviewed and addended the HPI, PE and a/p as necessary.     58 yo F with HTN, lupus, RA a/w L groin pain starting at 7PM today and reports syncopal episode.  Reports pain in L groin is sharp and constant noted to have be tender to palpation. Pt reports at around 10 she went to use the bathroom and fainted twice, patient was caught by .  Noted to have LOC for approximately 30 seconds.  She had no prodromal symptoms prior to her fall. Patient is denying any back pain at this time. Denies recent illnesses, nausea, vomiting, fevers, chills, chest pain, shortness of breath, dysuria, diarrhea or constipation.    GEN - NAD; well appearing; A+O x3; non-toxic appearing  CARD -s1s2, RRR, no M,G,R;   PULM - CTA b/l, symmetric breath sounds;   ABD -  +BS, ND, NT, soft, no guarding, no rebound, no masses;   BACK - no CVA tenderness, Normal  spine;   EXT - symmetric pulses, 2+ dp, capillary refill < 2 seconds, no cyanosis, no edema; L groin tender to palpation along the inguinal ligament and at the insertion psoas.   NEURO - no focal neuro deficits, no slurred speech    Concern for possible syncope vs vasovagal, check electrolytes, troponin, EKG NSR with no acute st or t wave changes; Will obtain xray; and ultrasound of LE to r/o dvt.  Re-eval.

## 2023-06-12 NOTE — PATIENT PROFILE ADULT - STATED REASON FOR ADMISSION
Chief Complaint   Patient presents with    Hypertension    Cholesterol Problem    Arthritis     1. Have you been to the ER, urgent care clinic since your last visit? Hospitalized since your last visit? No    2. Have you seen or consulted any other health care providers outside of the [address] [place] [address] since your last visit? Include any pap smears or colon screening.  No     Health Maintenance Due   Topic Date Due    GLAUCOMA SCREENING Q2Y  02/14/2018    Pneumococcal 65+ Low/Medium Risk (1 of 2 - PCV13) 02/14/2018    Influenza Age 9 to Adult  08/01/2018
HISTORY OF PRESENT ILLNESS  Marya Marshall is a 71 y.o. male. HPI In for blood pressure check. Has been having arthritis pains in both arms and back. Had a hip replacement, and now has some pain in r femur. Not taking any meds at present. OTC meds- didn't help that much. Has been to see urology for elevated PSA (at [facility]), goes back in September    ROS    Physical Exam   Constitutional: He appears well-developed and well-nourished. HENT:   Right Ear: External ear normal.   Left Ear: External ear normal.   Mouth/Throat: Oropharynx is clear and moist.   Neck: No thyromegaly present. Cardiovascular: Normal rate, regular rhythm, normal heart sounds and intact distal pulses. Pulmonary/Chest: Effort normal and breath sounds normal. No respiratory distress. He has no wheezes. Abdominal: Soft. Bowel sounds are normal. He exhibits no distension and no mass. There is no tenderness. There is no guarding. Musculoskeletal: Normal range of motion. He exhibits no edema. Lymphadenopathy:     He has no cervical adenopathy. Nursing note and vitals reviewed. ASSESSMENT and PLAN  Orders Placed This Encounter    METABOLIC PANEL, COMPREHENSIVE    LIPID PANEL    CBC WITH AUTOMATED DIFF    amLODIPine (NORVASC) 10 mg tablet    hydroCHLOROthiazide (HYDRODIURIL) 25 mg tablet    diclofenac EC (VOLTAREN) 75 mg EC tablet     Diagnoses and all orders for this visit:    1. Essential hypertension  -     METABOLIC PANEL, COMPREHENSIVE  -     CBC WITH AUTOMATED DIFF    2. High cholesterol  -     LIPID PANEL    Other orders  -     amLODIPine (NORVASC) 10 mg tablet; Take 1 Tab by mouth daily. For blood pressure  -     hydroCHLOROthiazide (HYDRODIURIL) 25 mg tablet; TAKE 1 TABLET BY MOUTH DAILY FOR BLOOD PRESSURE  -     diclofenac EC (VOLTAREN) 75 mg EC tablet; Take 1 Tab by mouth two (2) times a day. For arthritis      Follow-up Disposition:  Return in about 6 months (around 2/8/2019).
Syncope and pain

## 2023-06-12 NOTE — ED ADULT NURSE NOTE - ED STAT RN HANDOFF DETAILS
pt aox4, in stable condition. denies pain, cough, sob, chills, fever at this time. all due meds and care rendered. handoff given to rn to monitor and f/u with poc. pt aox4, in stable condition. denies pain, cough, sob, chills, fever at this time. all due meds and care rendered. handoff given to chela Franklin to monitor and f/u with poc.

## 2023-06-12 NOTE — H&P ADULT - PROBLEM SELECTOR PLAN 1
- patient with episode of syncope X2 once at home and here in ED upon standing, EKG with NSR with no ST- T wave changes noted, episode occurred after standing up from the toilet, LOC but was caught by her ,   - recived 1L bolus  - Patient was recently started on HCTZ approx, 3 months ago, nay have been contributing to syncope causing hypotension from over diuresis, will hold off   - EKG NSR, Trops <6, Ddimer negative, CTH negative   - BPs soft 90s, orthostatic negaitve in ED,   - continue to monitor on telemetry  - TTE ordered, no previous echo in chart; pending echo results    - will hold off on antihypertensive at this time  - encourage PO intake

## 2023-06-12 NOTE — ED PROVIDER NOTE - OBJECTIVE STATEMENT
57-year-old female history of HTN, lupus, RA presenting with a chief complaint of left groin pain that began at 7 PM today.  Patient states the pain is sharp and consistent.  Describes it as a contraction-like feeling down to her calf. Patient states that around 10 she went to use the bathroom and fainted twice.  Patient states she did not hit her head was caught by her .  States she that she lost consciousness for approximately 30 seconds.  The patient states she had no prodromal symptoms prior to her fall. Patient is denying any back pain at this time. Patient states that she recently went to PMD and had blood in her urine.   Patient denies recent illnesses, nausea, vomiting, fevers, chills, chest pain, shortness of breath, dysuria, diarrhea or constipation.

## 2023-06-12 NOTE — ED ADULT NURSE REASSESSMENT NOTE - NS ED NURSE REASSESS COMMENT FT1
pt stable. noted with syncopal episode after arising from bed. pt safely placed in bed. fall precautions maintained. pt s/p ultrasound. awaiting results.  at the bedside. poc to be continued as per md orders-will continue to monitor and f/u.

## 2023-06-12 NOTE — ED ADULT NURSE NOTE - NSFALLHARMRISKINTERV_ED_ALL_ED
Assistance OOB with selected safe patient handling equipment if applicable/Assistance with ambulation/Communicate risk of Fall with Harm to all staff, patient, and family/Orthostatic vital signs/Provide visual cue: red socks, yellow wristband, yellow gown, etc/Reinforce activity limits and safety measures with patient and family/Bed in lowest position, wheels locked, appropriate side rails in place/Call bell, personal items and telephone in reach/Instruct patient to call for assistance before getting out of bed/chair/stretcher/Non-slip footwear applied when patient is off stretcher/[place] to call system/Physically safe environment - no spills, clutter or unnecessary equipment/Purposeful Proactive Rounding/Room/bathroom lighting operational, light cord in reach

## 2023-06-12 NOTE — PATIENT PROFILE ADULT - FALL HARM RISK - UNIVERSAL INTERVENTIONS
Bed in lowest position, wheels locked, appropriate side rails in place/Call bell, personal items and telephone in reach/Instruct patient to call for assistance before getting out of bed or chair/Non-slip footwear when patient is out of bed/[place] to call system/Physically safe environment - no spills, clutter or unnecessary equipment/Purposeful Proactive Rounding/Room/bathroom lighting operational, light cord in reach

## 2023-06-12 NOTE — ED ADULT NURSE NOTE - PATIENT'S GENDER IDENTITY
[employer] Female

## 2023-06-12 NOTE — H&P ADULT - HISTORY OF PRESENT ILLNESS
58 y/o M with pmhx of HTN, Lupus, RA who presents for syncopal episode overnight. Patient states that yesterday she started to have right leg/groin pain that was excruciating in nature.       In ED, BP 97//73, TMax 98.7, HR 66-75. Received 1L bolus and 4mg IV morphine.  58 y/o M with pmhx of HTN, Lupus, RA who presents for syncopal episode overnight. Patient states that yesterday she started to have right leg/groin pain that was excruciating in nature. Patient states that pain gradually worsening over time to the pont whre she had difficulty walking 2/2 to pain. Due to pain she needed to be assisted to bathroom by her . After using toiler patient stood up and passed. She denies not sustain any head injuries or fall to the ground as she was caught by her . Her  then placed her in a yessica where she passed out again.  Montrell states that she passed out for approx. 30seconds. He denies any shaking, urinary or fecal incontinence. She denies any CP, SOB prior to event, She stated that she felt a dizziness after standing up from toilet. Patient states that she take losartan-HCTZ combo pill for the last 3 months, States he was initially on 50mg losartan and was increased to 100mg a few months ago. Also she states that that she recently started on HCTZ 3 months ago.  She denies any trauma or fall recently.       In ED, BP 97//73, TMax 98.7, HR 66-75. Received 1L bolus and 4mg IV morphine. Admitted to medicine for further evaluation and workup,

## 2023-06-12 NOTE — ED PROVIDER NOTE - PHYSICAL EXAMINATION
GENERAL: Awake, alert, NAD  HEENT: NC/AT, moist mucous membranes, PERRL, EOMI  LUNGS: CTAB, no wheezes or crackles   CARDIAC: RRR, no m/r/g  ABDOMEN: Soft, non tender, non distended, no rebound, no guarding  BACK: No midline spinal tenderness, no CVA tenderness  EXT: No edema, no calf tenderness, 2+ DP pulses bilaterally, no deformities. +TTP of right groin, + TTP of anterior aspect of the right thigh.   NEURO: A&Ox3. Moving all extremities.  SKIN: Warm and dry. No rash.  PSYCH: Normal affect.

## 2023-06-12 NOTE — H&P ADULT - PROBLEM SELECTOR PLAN 2
- right leg pain located in upper right thing near groin, no swelling or erythema noted; area does feel tense compared to left; maybe muscle spasms  - will trial on muscle relaxer and PRN pain meds for control   - UE DVT negative   - Xray right hip, no signs of fracture or dislocations

## 2023-06-12 NOTE — ED ADULT NURSE NOTE - OBJECTIVE STATEMENT
as per triage nurse , "alert oriented c/o groin pain that radiated down right leg pain  passed out into husbands arms after getting off toilet at 2300 Phx HTN lupus  groin pain started gradually tonight around 2000"

## 2023-06-12 NOTE — H&P ADULT - NSHPLABSRESULTS_GEN_ALL_CORE
140  |  101  |  17  ----------------------------<  108<H>  3.5   |  30  |  0.89    Ca    9.5      2023 02:07    TPro  7.2  /  Alb  4.3  /  TBili  0.4  /  DBili  x   /  AST  25  /  ALT  28  /  AlkPhos  69  12                    Urinalysis Basic - ( 2023 03:30 )    Color: Light Yellow / Appearance: Clear / S.018 / pH: x  Gluc: x / Ketone: Negative  / Bili: Negative / Urobili: <2 mg/dL   Blood: x / Protein: 30 mg/dL / Nitrite: Negative   Leuk Esterase: Negative / RBC: 6 /HPF / WBC 1 /HPF   Sq Epi: x / Non Sq Epi: x / Bacteria: Negative                              13.7   7.49  )-----------( 174      ( 2023 02:07 )             40.8     CAPILLARY BLOOD GLUCOSE        Blood Gas Source Venous: Venous ( @ 02:07)

## 2023-06-12 NOTE — H&P ADULT - TIME BILLING
20mins-Chart and previous hospitalizations reviewed as well as labs, vitals prior to patient being seen for approximately   11mins-Meds reviewed and prior outpatient workup   20 mins-Patient seen and examined and plan discussed, all questions were answered to the best of my ability  30 mins-Charting/documentation and orders

## 2023-06-12 NOTE — ED ADULT TRIAGE NOTE - CHIEF COMPLAINT QUOTE
alert oriented c/o groin pain that radiated down right leg pain  passed out into husbands arms after getting off toilet at 2300 Phx HTN lupus  groin pain started gradually tonight around 2000

## 2023-06-12 NOTE — H&P ADULT - NSHPPHYSICALEXAM_GEN_ALL_CORE
T(C): 37.1 (06-12-23 @ 08:34), Max: 37.1 (06-12-23 @ 08:34)  HR: 70 (06-12-23 @ 08:34) (66 - 75)  BP: 97/53 (06-12-23 @ 08:34) (97/53 - 117/73)  RR: 14 (06-12-23 @ 08:34) (14 - 16)  SpO2: 100% (06-12-23 @ 08:34) (99% - 100%)    CONSTITUTIONAL: Well groomed, no apparent distress  EYES: No conjunctival or scleral injection, non-icteric  ENMT: Oral mucosa with moist membranes. Normal dentition; no pharyngeal injection or exudates  RESP: No respiratory distress, no use of accessory muscles; CTA b/l, no WRR  CV: RRR, +S1S2, no MRG; no JVD; no peripheral edema  GI: Soft, NT, ND, no rebound, no guarding; no palpable masses; no hepatosplenomegaly; no hernia palpated  MSK: No digital clubbing or cyanosis;  Normal ROM with mild pain on RLE, TTP in right upper thigh,  normal muscle strength/tone  SKIN: No rashes or ulcers noted; no subcutaneous nodules or induration palpable  NEURO: CN II-XII intact; normal reflexes in upper and lower extremities, sensation intact in upper and lower extremities b/l to light touch   PSYCH: Appropriate insight/judgment; A+O x 3, mood and affect appropriate, recent/remote memory intact

## 2023-06-12 NOTE — H&P ADULT - NSICDXFAMILYHX_GEN_ALL_CORE_FT
FAMILY HISTORY:  Father  Still living? Unknown  Family history of hypertension in father, Age at diagnosis: Age Unknown    Mother  Still living? Unknown  FH: breast cancer, Age at diagnosis: Age Unknown

## 2023-06-13 LAB
A1C WITH ESTIMATED AVERAGE GLUCOSE RESULT: 5.6 % — SIGNIFICANT CHANGE UP (ref 4–5.6)
ALBUMIN SERPL ELPH-MCNC: 3.7 G/DL — SIGNIFICANT CHANGE UP (ref 3.3–5)
ALP SERPL-CCNC: 62 U/L — SIGNIFICANT CHANGE UP (ref 40–120)
ALT FLD-CCNC: 22 U/L — SIGNIFICANT CHANGE UP (ref 4–33)
ANION GAP SERPL CALC-SCNC: 10 MMOL/L — SIGNIFICANT CHANGE UP (ref 7–14)
AST SERPL-CCNC: 19 U/L — SIGNIFICANT CHANGE UP (ref 4–32)
BILIRUB SERPL-MCNC: 0.4 MG/DL — SIGNIFICANT CHANGE UP (ref 0.2–1.2)
BUN SERPL-MCNC: 11 MG/DL — SIGNIFICANT CHANGE UP (ref 7–23)
CALCIUM SERPL-MCNC: 9 MG/DL — SIGNIFICANT CHANGE UP (ref 8.4–10.5)
CHLORIDE SERPL-SCNC: 109 MMOL/L — HIGH (ref 98–107)
CHOLEST SERPL-MCNC: 132 MG/DL — SIGNIFICANT CHANGE UP
CO2 SERPL-SCNC: 24 MMOL/L — SIGNIFICANT CHANGE UP (ref 22–31)
CREAT SERPL-MCNC: 0.76 MG/DL — SIGNIFICANT CHANGE UP (ref 0.5–1.3)
CULTURE RESULTS: SIGNIFICANT CHANGE UP
EGFR: 91 ML/MIN/1.73M2 — SIGNIFICANT CHANGE UP
ESTIMATED AVERAGE GLUCOSE: 114 — SIGNIFICANT CHANGE UP
GLUCOSE SERPL-MCNC: 91 MG/DL — SIGNIFICANT CHANGE UP (ref 70–99)
HCV AB S/CO SERPL IA: 1.02 S/CO — HIGH (ref 0–0.99)
HCV AB SERPL-IMP: ABNORMAL
HCV RNA FLD QL NAA+PROBE: SIGNIFICANT CHANGE UP
HCV RNA SPEC QL PROBE+SIG AMP: SIGNIFICANT CHANGE UP
HDLC SERPL-MCNC: 70 MG/DL — SIGNIFICANT CHANGE UP
LIPID PNL WITH DIRECT LDL SERPL: 57 MG/DL — SIGNIFICANT CHANGE UP
MAGNESIUM SERPL-MCNC: 2.2 MG/DL — SIGNIFICANT CHANGE UP (ref 1.6–2.6)
NON HDL CHOLESTEROL: 62 MG/DL — SIGNIFICANT CHANGE UP
PHOSPHATE SERPL-MCNC: 2.9 MG/DL — SIGNIFICANT CHANGE UP (ref 2.5–4.5)
POTASSIUM SERPL-MCNC: 4.1 MMOL/L — SIGNIFICANT CHANGE UP (ref 3.5–5.3)
POTASSIUM SERPL-SCNC: 4.1 MMOL/L — SIGNIFICANT CHANGE UP (ref 3.5–5.3)
PROT SERPL-MCNC: 6.4 G/DL — SIGNIFICANT CHANGE UP (ref 6–8.3)
SODIUM SERPL-SCNC: 143 MMOL/L — SIGNIFICANT CHANGE UP (ref 135–145)
SPECIMEN SOURCE: SIGNIFICANT CHANGE UP
TRIGL SERPL-MCNC: 25 MG/DL — SIGNIFICANT CHANGE UP

## 2023-06-13 PROCEDURE — 99232 SBSQ HOSP IP/OBS MODERATE 35: CPT

## 2023-06-13 RX ORDER — HYDROXYCHLOROQUINE SULFATE 200 MG
200 TABLET ORAL AT BEDTIME
Refills: 0 | Status: DISCONTINUED | OUTPATIENT
Start: 2023-06-13 | End: 2023-06-14

## 2023-06-13 RX ADMIN — Medication 200 MILLIGRAM(S): at 21:23

## 2023-06-13 NOTE — PROGRESS NOTE ADULT - ASSESSMENT
56 y/o M with pmhx of HTN, Lupus, RA who presents for syncopal episode last night. Admitted to medicine for further workup

## 2023-06-13 NOTE — PROGRESS NOTE ADULT - PROBLEM SELECTOR PLAN 1
- patient with episode of syncope X2 once at home and here in ED upon standing, EKG with NSR with no ST- T wave changes noted, episode occurred after standing up from the toilet, LOC but was caught by her ,   - Patient was recently started on HCTZ approx, 3 months ago, nay have been contributing to syncope causing hypotension from over diuresis, will hold off on anti-HTNs   - EKG NSR, Trops <6, Ddimer negative, CTH negative   - BPs soft 90s, orthostatic negaitve in ED,   - received 1L bolus in ED. encourage PO intake  - continue to monitor on telemetry: NSR thus far   - TTE: without significant abnormalities     - will hold off on antihypertensive at this time

## 2023-06-13 NOTE — PHYSICAL THERAPY INITIAL EVALUATION ADULT - PERTINENT HX OF CURRENT PROBLEM, REHAB EVAL
Pt is a 57 year old female presenting with the hospital after syncopal episode x2 at home. Pt did not experience head trauma due to  who caught her.  Pt states that she began to have right leg/groin pan but upon presentation to the hospital, x-ray shows no sign of fractures/dislocations.

## 2023-06-13 NOTE — PHYSICAL THERAPY INITIAL EVALUATION ADULT - ADDITIONAL COMMENTS
Pt goes back and forth between her home in [place] and [place] in which she lives with her . Her [place] home has 3 steps to enter and then 13 steps to reach the bedroom. Her home in [place] has no steps to enter/to reach the bedroom. Pt reports to be independent in all ADLs/ambulation with no assistive device prior to admission. Pt reports no falls in the last 6 months.    Pt left in bedside chair, all lines/tubes intact, NAD, +Call bell in reach, RN aware. Pt goes back and forth between her home in [place] and [place] in which she lives with her . Her [place] home has 3 steps to enter and then 13 steps to reach the bedroom. Her home in [place] has no steps to enter/to reach the bedroom. Pt reports to be independent in all ADLs/ambulation with no assistive device prior to admission.    Pt left in bedside chair, all lines/tubes intact, NAD, +Call bell in reach, RN aware of session and Pt current position.

## 2023-06-13 NOTE — PHYSICAL THERAPY INITIAL EVALUATION ADULT - NSPTDISCHREC_GEN_A_CORE
Pt functioning at baseline activity and mobility/No skilled PT needs Pt to remain on inpatient PT program for an additional 1-2 sessions to progress mobility and prevent deconditioning/No skilled PT needs

## 2023-06-13 NOTE — PROGRESS NOTE ADULT - PROBLEM SELECTOR PLAN 2
- right leg pain located in upper right thing near groin, no swelling or erythema noted; area does feel tense compared to left; maybe muscle spasms  - will trial on muscle relaxer and PRN pain meds for control   - LE DVT negative   - Xray right hip, no signs of fracture or dislocations

## 2023-06-13 NOTE — PROGRESS NOTE ADULT - SUBJECTIVE AND OBJECTIVE BOX
Jesus Miner MD  [facility] Division of Hospital Medicine  Pager: [phone]  Available via MS Teams    SUBJECTIVE / OVERNIGHT EVENTS:    No further episodes of syncope   Mild flushing episode today      MEDICATIONS  (STANDING):  hydroxychloroquine 200 milliGRAM(s) Oral at bedtime  naloxone Injectable 0.4 milliGRAM(s) IV Push once  polyethylene glycol 3350 17 Gram(s) Oral daily  senna 2 Tablet(s) Oral at bedtime    MEDICATIONS  (PRN):  acetaminophen     Tablet .. 650 milliGRAM(s) Oral every 6 hours PRN Temp greater or equal to 38C (100.4F), Mild Pain (1 - 3)  bisacodyl 5 milliGRAM(s) Oral daily PRN Constipation  cyclobenzaprine 5 milliGRAM(s) Oral every 8 hours PRN Spasm  melatonin 3 milliGRAM(s) Oral at bedtime PRN Insomnia  ondansetron Injectable 4 milliGRAM(s) IV Push every 8 hours PRN Nausea and/or Vomiting  oxyCODONE    IR 2.5 milliGRAM(s) Oral every 4 hours PRN Moderate Pain (4 - 6)  oxyCODONE    IR 5 milliGRAM(s) Oral every 4 hours PRN Severe Pain (7 - 10)      I&O's Summary      PHYSICAL EXAM:  Vital Signs Last 24 Hrs  T(C): 36.6 (2023 12:55), Max: 36.7 (2023 21:00)  T(F): 97.9 (2023 12:55), Max: 98.1 (2023 21:00)  HR: 74 (2023 12:55) (61 - 74)  BP: 131/70 (2023 12:55) (122/77 - 144/87)  BP(mean): --  RR: 18 (2023 12:55) (18 - 18)  SpO2: 100% (2023 12:55) (100% - 100%)    Parameters below as of 2023 12:55  Patient On (Oxygen Delivery Method): room air      CONSTITUTIONAL: NAD, well-developed   EYES: conjunctiva and sclera clear  ENMT: Moist oral mucosa   NECK: Supple   RESPIRATORY: Normal respiratory effort; lungs are clear to auscultation bilaterally  CARDIOVASCULAR: Regular rate and rhythm, normal S1 and S2, no murmur/rub/gallop; Peripheral pulses are 2+ bilaterally  ABDOMEN: Nontender to palpation, normoactive bowel sounds, no rebound/guarding   MUSCULOSKELETAL: No lower extremity edema   PSYCH: A+O to person, place, and time; affect appropriate  NEUROLOGY: no gross sensory or motor deficits   SKIN: No rashes    LABS:                        13.7   7.49  )-----------( 174      ( 2023 02:07 )             40.8     06-13    143  |  109<H>  |  11  ----------------------------<  91  4.1   |  24  |  0.76    Ca    9.0      2023 06:15  Phos  2.9     06-13  Mg     2.20     06-13    TPro  6.4  /  Alb  3.7  /  TBili  0.4  /  DBili  x   /  AST  19  /  ALT  22  /  AlkPhos  62  06-13      CARDIAC MARKERS ( 2023 07:04 )  x     / x     / 245 U/L / x     / 1.4 ng/mL      Urinalysis Basic - ( 2023 03:30 )    Color: Light Yellow / Appearance: Clear / S.018 / pH: x  Gluc: x / Ketone: Negative  / Bili: Negative / Urobili: <2 mg/dL   Blood: x / Protein: 30 mg/dL / Nitrite: Negative   Leuk Esterase: Negative / RBC: 6 /HPF / WBC 1 /HPF   Sq Epi: x / Non Sq Epi: x / Bacteria: Negative        Culture - Urine (collected 2023 01:30)  Source: Clean Catch Clean Catch (Midstream)  Final Report (2023 08:44):    <10,000 CFU/mL Normal Urogenital Maryjane          RADIOLOGY & ADDITIONAL TESTS:  Other Results Reviewed Today: BMP with stable Cr, CBC with stable Hg     COORDINATION OF CARE:  Communication: discussed plan of care with ACP

## 2023-06-13 NOTE — PHYSICAL THERAPY INITIAL EVALUATION ADULT - GENERAL OBSERVATIONS, REHAB EVAL
Pt received sitting upright in bed, +telemetry, all lines/tubes intact, NAD. Heart rate: 74 beats per minute, oxygen saturation: 100%, BP: 148/79 mmHg

## 2023-06-14 ENCOUNTER — NON-APPOINTMENT (OUTPATIENT)
Age: 58
End: 2023-06-14

## 2023-06-14 ENCOUNTER — TRANSCRIPTION ENCOUNTER (OUTPATIENT)
Age: 58
End: 2023-06-14

## 2023-06-14 VITALS
TEMPERATURE: 98 F | SYSTOLIC BLOOD PRESSURE: 128 MMHG | OXYGEN SATURATION: 95 % | HEART RATE: 87 BPM | DIASTOLIC BLOOD PRESSURE: 88 MMHG | RESPIRATION RATE: 18 BRPM

## 2023-06-14 PROBLEM — M06.9 RHEUMATOID ARTHRITIS, UNSPECIFIED: Chronic | Status: ACTIVE | Noted: 2023-06-12

## 2023-06-14 PROCEDURE — 99239 HOSP IP/OBS DSCHRG MGMT >30: CPT

## 2023-06-14 RX ORDER — LOSARTAN POTASSIUM 100 MG/1
2 TABLET, FILM COATED ORAL
Qty: 60 | Refills: 0
Start: 2023-06-14 | End: 2023-07-13

## 2023-06-14 RX ORDER — LOSARTAN POTASSIUM 100 MG/1
50 TABLET, FILM COATED ORAL DAILY
Refills: 0 | Status: DISCONTINUED | OUTPATIENT
Start: 2023-06-14 | End: 2023-06-14

## 2023-06-14 RX ORDER — ASPIRIN/CALCIUM CARB/MAGNESIUM 324 MG
1 TABLET ORAL
Qty: 0 | Refills: 0 | DISCHARGE
Start: 2023-06-14

## 2023-06-14 RX ORDER — LOSARTAN/HYDROCHLOROTHIAZIDE 100MG-25MG
0 TABLET ORAL
Qty: 0 | Refills: 0 | DISCHARGE

## 2023-06-14 RX ORDER — ASPIRIN/CALCIUM CARB/MAGNESIUM 324 MG
81 TABLET ORAL AT BEDTIME
Refills: 0 | Status: DISCONTINUED | OUTPATIENT
Start: 2023-06-14 | End: 2023-06-14

## 2023-06-14 RX ADMIN — LOSARTAN POTASSIUM 50 MILLIGRAM(S): 100 TABLET, FILM COATED ORAL at 11:09

## 2023-06-14 NOTE — DISCHARGE NOTE PROVIDER - NSDCCPCAREPLAN_GEN_ALL_CORE_FT
PRINCIPAL DISCHARGE DIAGNOSIS  Diagnosis: Syncope  Assessment and Plan of Treatment: - you came in because of passing out. Your electrolytes, CT scan of the head, EKG, echocardiogram, and telemetry was all relatively normal.   - we did note your blood pressure fluctuated a bit and you had soft blood pressures when you first came in   - please stop your home blood pressure pill  - instead start losartan 50 mg every morning   - check your blood pressures in the morning at night   - if your blood pressures are high (>140/90), increase to losartan 75 every morning   -please see your Primary care doctor asap for further mangement      SECONDARY DISCHARGE DIAGNOSES  Diagnosis: Right leg pain  Assessment and Plan of Treatment: this resolved while hosptialized. Continue to monitor

## 2023-06-14 NOTE — DISCHARGE NOTE PROVIDER - ATTENDING DISCHARGE PHYSICAL EXAMINATION:
.  VITAL SIGNS:  T(C): 36.6 (06-14-23 @ 12:17), Max: 36.8 (06-14-23 @ 05:10)  T(F): 97.9 (06-14-23 @ 12:17), Max: 98.2 (06-14-23 @ 05:10)  HR: 87 (06-14-23 @ 12:17) (61 - 87)  BP: 128/88 (06-14-23 @ 12:17) (120/75 - 154/82)  BP(mean): --  RR: 18 (06-14-23 @ 12:17) (16 - 18)  SpO2: 95% (06-14-23 @ 12:17) (95% - 100%)  Wt(kg): --    PHYSICAL EXAM:    Constitutional: NAD, resting comfortably   Head: NC/AT  Eyes: anicteric sclera  Respiratory: clear to ascultation b/l, No wheezing or rhonchi, no retractions   Cardiac: +S1/S2; RRR; no M/R/G  Gastrointestinal: abdomen soft, non-distended, no rebound or guarding; +BSx4  Extremities: no peripheral edema  Musculoskeletal: NROM x4; no joint swelling, tenderness or erythema  Vascular: 2+ radial, DP pulses B/L  Neurologic: AAOx3; no gross focal deficits  Psychiatric: affect and characteristics of appearance, verbalizations, behaviors are appropriate

## 2023-06-14 NOTE — DISCHARGE NOTE PROVIDER - NSDCMRMEDTOKEN_GEN_ALL_CORE_FT
aspirin 81 mg oral tablet, chewable: 1 tab(s) orally once a day (at bedtime)  HYDROXYCHLOROQUINE 200 MG TAB: TAKE 1 TABLET BY MOUTH EVERY DAY  losartan 25 mg oral tablet: 2 tab(s) orally once a day Monitor Blood Pressure Regularly. Hold for low blood pressure.

## 2023-06-14 NOTE — DISCHARGE NOTE PROVIDER - CARE PROVIDER_API CALL
Kush Overton  Physician Assistant Services  [address] [place] [address], [address]  [place] [address]  Phone: [phone]  Fax: [phone]  Follow Up Time:

## 2023-06-14 NOTE — DISCHARGE NOTE PROVIDER - ATTENDING DISCHARGE PHYSICAL EXAM:
Albuterol nebs 0.083% was therapeutically interchanged for Albuterol MDI  per the P&T Committee approved Therapeutic Interchanges Policy.     Tristan Raman, [facility], Pharmacist  4/18/2017 1:59 PM Attending Discharge Physical Examination:

## 2023-06-14 NOTE — DISCHARGE NOTE PROVIDER - NSDCFUSCHEDAPPT_GEN_ALL_CORE_FT
[facility] Physician [facility] [address]  Scheduled Appointment: 06/16/2023

## 2023-06-14 NOTE — DISCHARGE NOTE NURSING/CASE MANAGEMENT/SOCIAL WORK - PATIENT PORTAL LINK FT
You can access the FollowMyHealth Patient Portal offered by [facility] by registering at the following website: [email or web address]. By joining [facility]’s FollowMyHealth portal, you will also be able to view your health information using other applications (apps) compatible with our system.

## 2023-06-14 NOTE — DISCHARGE NOTE PROVIDER - HOSPITAL COURSE
58 y/o M with pmhx of HTN, Lupus, RA who presents for syncope     #Syncope  - patient with syncope X2 once at home and here in ED upon standing, no head trama   - EKG with NSR with no ST- T wave changes noted, trops negative    - Ddimer negative, CTH negative   - tele was NSR throughout hospitalization   - TTE: without significant abnormalities     - patient was found to have soft BPs 90s in ED  - she was notably maxed on losartan and started on HCTZ 12.5 in January   - during the day pt had syncope, she was found to have MSK pain in her thigh and had decreased PO intake   - syncope most likely orthostatic related or vasovagal   - plan to DC HCTZ altogether   - decrease losartan to 50 from 100 mg daily   - follow up with PCP within 1 week      #Right leg pain   - right leg pain located in upper right thing near groin, no swelling or erythema noted  - LE DVT negative   - Xray right hip, no signs of fracture or dislocations.  - most likely MSK / muscle spasm related     #Lupus.   - no signs of flare  - on hydroxychlorquine 200mg qhs, will continue.    #Rheumatoid arthritis.   - no joint swelling or nodules noted  - OP follow up     Patient medically stable for DC

## 2023-06-16 ENCOUNTER — APPOINTMENT (OUTPATIENT)
Dept: INTERNAL MEDICINE | Facility: CLINIC | Age: 58
End: 2023-06-16
Payer: COMMERCIAL

## 2023-06-16 VITALS
DIASTOLIC BLOOD PRESSURE: 80 MMHG | OXYGEN SATURATION: 98 % | SYSTOLIC BLOOD PRESSURE: 148 MMHG | WEIGHT: 143 LBS | TEMPERATURE: 98.4 F | HEART RATE: 74 BPM | BODY MASS INDEX: 22.98 KG/M2 | HEIGHT: 66 IN

## 2023-06-16 VITALS — DIASTOLIC BLOOD PRESSURE: 78 MMHG | SYSTOLIC BLOOD PRESSURE: 118 MMHG

## 2023-06-16 DIAGNOSIS — Z09 ENCOUNTER FOR FOLLOW-UP EXAMINATION AFTER COMPLETED TREATMENT FOR CONDITIONS OTHER THAN MALIGNANT NEOPLASM: ICD-10-CM

## 2023-06-16 DIAGNOSIS — I83.93 ASYMPTOMATIC VARICOSE VEINS OF BILATERAL LOWER EXTREMITIES: ICD-10-CM

## 2023-06-16 PROBLEM — M32.9 SYSTEMIC LUPUS ERYTHEMATOSUS, UNSPECIFIED: Chronic | Status: ACTIVE | Noted: 2023-06-12

## 2023-06-16 PROCEDURE — 99496 TRANSJ CARE MGMT HIGH F2F 7D: CPT

## 2023-06-19 PROBLEM — Z09 HOSPITAL DISCHARGE FOLLOW-UP: Status: ACTIVE | Noted: 2023-06-19

## 2023-06-19 PROBLEM — I83.93 VARICOSE VEINS OF LEGS: Status: ACTIVE | Noted: 2023-06-19

## 2023-06-19 LAB
APPEARANCE: CLEAR
BACTERIA: NEGATIVE /HPF
BILIRUBIN URINE: NEGATIVE
BLOOD URINE: ABNORMAL
CAST: 0 /LPF
COLOR: YELLOW
EPITHELIAL CELLS: 0 /HPF
GLUCOSE QUALITATIVE U: NEGATIVE MG/DL
KETONES URINE: ABNORMAL MG/DL
LEUKOCYTE ESTERASE URINE: NEGATIVE
MICROSCOPIC-UA: NORMAL
NITRITE URINE: NEGATIVE
PH URINE: 6
PROTEIN URINE: NEGATIVE MG/DL
RED BLOOD CELLS URINE: 2 /HPF
REVIEW: NORMAL
SPECIFIC GRAVITY URINE: 1.02
UROBILINOGEN URINE: 0.2 MG/DL
WHITE BLOOD CELLS URINE: 0 /HPF

## 2023-06-19 NOTE — REVIEW OF SYSTEMS
[Negative] : Respiratory [FreeTextEntry5] : Patient states she gets tenderness of varicose veins on bilateral legs [FreeTextEntry9] : Intermittent right hip pain

## 2023-06-19 NOTE — PHYSICAL EXAM
[Normal] : normal rate, regular rhythm, normal S1 and S2 and no murmur heard [de-identified] : Bilateral lower extremity varicose veins

## 2023-06-19 NOTE — PLAN
[FreeTextEntry1] : Patient's home blood pressures have been within range for the last 2 days 1 teens to 120s over 70s\par Today in office her blood pressure is within normal range\par For the last 2 days patient has not been on losartan\par Patient will continue to hold losartan unless her blood pressure increases 140 on systolic and diastolic over 90\par Recommend patient following up with her cardiologist nephrologist and possibly getting another 24-hour blood pressure monitor \par Patient may need to still change medication as her blood pressures are erratic\par Patient to continue to maintain normal diet and fluid intake\par Patient recommended to see a vascular specialist as she states her hip is not where the pain is coming from the pain is coming from lower extremities at areas of varicose veins and she is getting increased pain with walking or standing for long times\par Patient will follow-up with specialist and return to office to see me in 3 to 4 months or before then if needed

## 2023-06-19 NOTE — HISTORY OF PRESENT ILLNESS
[Post-hospitalization from ___ Hospital] : Post-hospitalization from [unfilled] Hospital [Admitted on: ___] : The patient was admitted on [unfilled] [Discharged on ___] : discharged on [unfilled] [Patient Contacted By: ____] : and contacted by [unfilled] [FreeTextEntry2] : 56 y/o female presents to the office today for BP check and with RT leg pain \par Pt states she fainted and was brought to [facility] by her \par Admitted: 06/11/2023\par Location [facility] \par 56 y/o M with pmhx of HTN, Lupus, RA who presents for syncope\par  \par #Syncope\par - patient with syncope X2 once at home and here in ED upon standing, no head\par trama\par - EKG with NSR with no ST- T wave changes noted, trops negative\par - Ddimer negative, CTH negative\par - tele was NSR throughout hospitalization\par - TTE: without significant abnormalities\par - patient was found to have soft BPs 90s in ED\par - she was notably maxed on losartan and started on HCTZ 12.5 in January\par - during the day pt had syncope, she was found to have MSK pain in her thigh\par and had decreased PO intake\par - syncope most likely orthostatic related or vasovagal\par - plan to DC HCTZ altogether\par - decrease losartan to 50 from 100 mg daily\par - follow up with PCP within 1 week\par  \par #Right leg pain\par - right leg pain located in upper right thing near groin, no swelling or\par erythema noted\par - LE DVT negative\par - Xray right hip, no signs of fracture or dislocations.\par - most likely MSK / muscle spasm related\par  \par #Lupus.\par - no signs of flare\par - on hydroxychlorquine 200mg qhs, will continue.\par  \par #Rheumatoid arthritis.\par - no joint swelling or nodules noted\par - OP follow up\par  \par

## 2023-08-03 ENCOUNTER — OUTPATIENT (OUTPATIENT)
Dept: OUTPATIENT SERVICES | Facility: HOSPITAL | Age: 58
LOS: 1 days | End: 2023-08-03
Payer: COMMERCIAL

## 2023-08-03 ENCOUNTER — APPOINTMENT (OUTPATIENT)
Dept: SURGERY | Facility: CLINIC | Age: 58
End: 2023-08-03
Payer: COMMERCIAL

## 2023-08-03 ENCOUNTER — APPOINTMENT (OUTPATIENT)
Dept: ULTRASOUND IMAGING | Facility: CLINIC | Age: 58
End: 2023-08-03
Payer: COMMERCIAL

## 2023-08-03 VITALS
BODY MASS INDEX: 22.98 KG/M2 | HEART RATE: 77 BPM | SYSTOLIC BLOOD PRESSURE: 181 MMHG | DIASTOLIC BLOOD PRESSURE: 94 MMHG | RESPIRATION RATE: 15 BRPM | WEIGHT: 143 LBS | HEIGHT: 66 IN

## 2023-08-03 DIAGNOSIS — I83.93 ASYMPTOMATIC VARICOSE VEINS OF BILATERAL LOWER EXTREMITIES: ICD-10-CM

## 2023-08-03 PROCEDURE — 93970 EXTREMITY STUDY: CPT

## 2023-08-03 PROCEDURE — 93970 EXTREMITY STUDY: CPT | Mod: 26

## 2023-08-03 PROCEDURE — 99242 OFF/OP CONSLTJ NEW/EST SF 20: CPT

## 2023-08-03 RX ORDER — ASPIRIN ENTERIC COATED TABLETS 81 MG 81 MG/1
81 TABLET, DELAYED RELEASE ORAL
Refills: 0 | Status: ACTIVE | COMMUNITY
Start: 2023-08-03

## 2023-08-03 NOTE — CONSULT LETTER
[Dear  ___] : Dear  [unfilled], [Consult Letter:] : I had the pleasure of evaluating your patient, [unfilled]. [Please see my note below.] : Please see my note below. [Consult Closing:] : Thank you very much for allowing me to participate in the care of this patient.  If you have any questions, please do not hesitate to contact me. [Sincerely,] : Sincerely, [FreeTextEntry3] : Wm Jigar SANCHEZ

## 2023-08-03 NOTE — PHYSICAL EXAM
[JVD] : no jugular venous distention  [Normal Breath Sounds] : Normal breath sounds [Normal Heart Sounds] : normal heart sounds [Abdomen Tenderness] : ~T ~M No abdominal tenderness [No Rash or Lesion] : No rash or lesion [Alert] : alert [Oriented to Person] : oriented to person [Oriented to Place] : oriented to place [Oriented to Time] : oriented to time [Calm] : calm [de-identified] : NAD [de-identified] : NC/AT PER [de-identified] : soft [de-identified] : no CVA tenderness [de-identified] : moves all 4 extr 5/5 [de-identified] : bilat spider veins lat knee

## 2023-08-03 NOTE — REVIEW OF SYSTEMS
[Fever] : no fever [Chills] : no chills [Eye Pain] : no eye pain [Earache] : no earache [Chest Pain] : no chest pain [Shortness Of Breath] : no shortness of breath [Abdominal Pain] : no abdominal pain [Limb Pain] : limb pain [Convulsions] : no convulsions [Easy Bleeding] : no tendency for easy bleeding [Easy Bruising] : no tendency for easy bruising

## 2023-08-03 NOTE — HISTORY OF PRESENT ILLNESS
[de-identified] : 56 yo female with bilat spider veins. No large varicosities. No ulcers or color change. Occ leg pain. No SOB.

## 2023-08-03 NOTE — PLAN
[FreeTextEntry1] : Duplex to evaluate venous system. May consider laser tx for spider veins. Total time > 32 minutes

## 2023-08-04 ENCOUNTER — NON-APPOINTMENT (OUTPATIENT)
Age: 58
End: 2023-08-04

## 2023-08-07 ENCOUNTER — APPOINTMENT (OUTPATIENT)
Dept: NEPHROLOGY | Facility: CLINIC | Age: 58
End: 2023-08-07
Payer: COMMERCIAL

## 2023-08-07 ENCOUNTER — LABORATORY RESULT (OUTPATIENT)
Age: 58
End: 2023-08-07

## 2023-08-07 VITALS
WEIGHT: 141.09 LBS | HEART RATE: 66 BPM | BODY MASS INDEX: 22.68 KG/M2 | TEMPERATURE: 97.1 F | HEIGHT: 66 IN | OXYGEN SATURATION: 99 % | DIASTOLIC BLOOD PRESSURE: 90 MMHG | SYSTOLIC BLOOD PRESSURE: 179 MMHG

## 2023-08-07 DIAGNOSIS — R31.29 OTHER MICROSCOPIC HEMATURIA: ICD-10-CM

## 2023-08-07 PROCEDURE — 99215 OFFICE O/P EST HI 40 MIN: CPT

## 2023-08-07 NOTE — PLAN
[TextEntry] : Pt has flutuating blood pressures. Will start losartan 25mg po qd which is a lower dose, cont to monitor BP at home. Microscopic hematuria seen on last UA. Will check labs to r/o GN/flare.  follow up in 3-4 months

## 2023-08-07 NOTE — HISTORY OF PRESENT ILLNESS
[FreeTextEntry1] :  57F with history of SLE and HTN presenting for follow up of hypertension. Pt saw me for the first time last year as referred by her rheumatologist for concerns of proteinuria in possible SLE. She checked her BP at home and it was 128/84. Her BP is 140s/90s sometimes.   She was in the ED for evaluation of her low blood pressure. She took losartan only 6 times last month.   Her test for plasma metanephrines came back high and I referred her to endocrinology and she had been following with Dr. Tesfaye.

## 2023-08-08 LAB
ALBUMIN SERPL ELPH-MCNC: 4.3 G/DL
ALP BLD-CCNC: 66 U/L
ALT SERPL-CCNC: 17 U/L
ANION GAP SERPL CALC-SCNC: 8 MMOL/L
APPEARANCE: CLEAR
AST SERPL-CCNC: 20 U/L
BILIRUB SERPL-MCNC: 0.4 MG/DL
BILIRUBIN URINE: NEGATIVE
BLOOD URINE: NEGATIVE
BUN SERPL-MCNC: 13 MG/DL
C3 SERPL-MCNC: 113 MG/DL
C4 SERPL-MCNC: 19 MG/DL
CALCIUM SERPL-MCNC: 9.6 MG/DL
CHLORIDE SERPL-SCNC: 106 MMOL/L
CO2 SERPL-SCNC: 27 MMOL/L
COLOR: YELLOW
CREAT SERPL-MCNC: 0.86 MG/DL
CREAT SPEC-SCNC: 51 MG/DL
CREAT/PROT UR: 0.1 RATIO
EGFR: 78 ML/MIN/1.73M2
ERYTHROCYTE [SEDIMENTATION RATE] IN BLOOD BY WESTERGREN METHOD: 5 MM/HR
FREE KAPPA URINE: 5.58 MG/L
FREE KAPPA/LAMDA RATIO: 4.23 RATIO
FREE LAMDA URINE: 1.32 MG/L
GLUCOSE QUALITATIVE U: NEGATIVE MG/DL
GLUCOSE SERPL-MCNC: 74 MG/DL
HAV IGM SER QL: NONREACTIVE
HBV CORE IGM SER QL: NONREACTIVE
HBV SURFACE AG SER QL: NONREACTIVE
HCV AB SER QL: NONREACTIVE
HCV S/CO RATIO: 0.97 S/CO
HGB A MFR BLD: 97.3 %
HGB A2 MFR BLD: 2.7 %
HGB FRACT BLD-IMP: NORMAL
HIV1+2 AB SPEC QL IA.RAPID: NONREACTIVE
KETONES URINE: NEGATIVE MG/DL
LEUKOCYTE ESTERASE URINE: NEGATIVE
NITRITE URINE: NEGATIVE
PH URINE: 6.5
POTASSIUM SERPL-SCNC: 3.9 MMOL/L
PROT SERPL-MCNC: 7.1 G/DL
PROT UR-MCNC: 5 MG/DL
PROTEIN URINE: NEGATIVE MG/DL
SODIUM SERPL-SCNC: 142 MMOL/L
SPECIFIC GRAVITY URINE: 1.01
URATE SERPL-MCNC: 4.1 MG/DL
UROBILINOGEN URINE: 0.2 MG/DL

## 2023-08-09 ENCOUNTER — RX RENEWAL (OUTPATIENT)
Age: 58
End: 2023-08-09

## 2023-08-09 LAB
ALBUMIN MFR SERPL ELPH: 57.9 %
ALBUMIN SERPL-MCNC: 4.1 G/DL
ALBUMIN/GLOB SERPL: 1.4 RATIO
ALPHA1 GLOB MFR SERPL ELPH: 3.8 %
ALPHA1 GLOB SERPL ELPH-MCNC: 0.3 G/DL
ALPHA2 GLOB MFR SERPL ELPH: 8.8 %
ALPHA2 GLOB SERPL ELPH-MCNC: 0.6 G/DL
B-GLOBULIN MFR SERPL ELPH: 11.5 %
B-GLOBULIN SERPL ELPH-MCNC: 0.8 G/DL
DEPRECATED KAPPA LC FREE/LAMBDA SER: 1.42 RATIO
GAMMA GLOB FLD ELPH-MCNC: 1.3 G/DL
GAMMA GLOB MFR SERPL ELPH: 18 %
IGA SER QL IEP: 242 MG/DL
IGG SER QL IEP: 1314 MG/DL
IGM SER QL IEP: 73 MG/DL
INTERPRETATION SERPL IEP-IMP: NORMAL
KAPPA LC CSF-MCNC: 1.21 MG/DL
KAPPA LC SERPL-MCNC: 1.72 MG/DL
M PROTEIN SPEC IFE-MCNC: NORMAL
PROT SERPL-MCNC: 7.1 G/DL
PROT SERPL-MCNC: 7.1 G/DL
PROTEINASE3 AB SER IA-ACNC: <5 UNITS
PROTEINASE3 AB SER-ACNC: NEGATIVE

## 2023-08-14 ENCOUNTER — RX CHANGE (OUTPATIENT)
Age: 58
End: 2023-08-14

## 2023-08-14 LAB
ANA PAT FLD IF-IMP: ABNORMAL
ANA SER IF-ACNC: ABNORMAL
KAPPA LC 24H UR QL: NORMAL
PHOSPHOLIPASE A2 RECEPTOR ELISA: <1.8 RU/ML

## 2023-09-16 ENCOUNTER — NON-APPOINTMENT (OUTPATIENT)
Age: 58
End: 2023-09-16

## 2023-09-16 RX ORDER — LOSARTAN POTASSIUM AND HYDROCHLOROTHIAZIDE 12.5; 1 MG/1; MG/1
100-12.5 TABLET ORAL
Qty: 90 | Refills: 0 | Status: DISCONTINUED | COMMUNITY
Start: 2023-02-15 | End: 2023-09-16

## 2023-11-02 ENCOUNTER — APPOINTMENT (OUTPATIENT)
Dept: NEUROSURGERY | Facility: CLINIC | Age: 58
End: 2023-11-02

## 2024-01-19 ENCOUNTER — OUTPATIENT (OUTPATIENT)
Dept: OUTPATIENT SERVICES | Facility: HOSPITAL | Age: 59
LOS: 1 days | End: 2024-01-19
Payer: COMMERCIAL

## 2024-01-19 ENCOUNTER — APPOINTMENT (OUTPATIENT)
Dept: MAMMOGRAPHY | Facility: CLINIC | Age: 59
End: 2024-01-19
Payer: COMMERCIAL

## 2024-01-19 ENCOUNTER — APPOINTMENT (OUTPATIENT)
Dept: ULTRASOUND IMAGING | Facility: CLINIC | Age: 59
End: 2024-01-19
Payer: COMMERCIAL

## 2024-01-19 DIAGNOSIS — Z00.8 ENCOUNTER FOR OTHER GENERAL EXAMINATION: ICD-10-CM

## 2024-01-19 PROCEDURE — 77067 SCR MAMMO BI INCL CAD: CPT | Mod: 26

## 2024-01-19 PROCEDURE — 77080 DXA BONE DENSITY AXIAL: CPT

## 2024-01-19 PROCEDURE — 77063 BREAST TOMOSYNTHESIS BI: CPT | Mod: 26

## 2024-01-19 PROCEDURE — 76641 ULTRASOUND BREAST COMPLETE: CPT | Mod: 26,50

## 2024-01-19 PROCEDURE — 77080 DXA BONE DENSITY AXIAL: CPT | Mod: 26

## 2024-01-19 PROCEDURE — 77063 BREAST TOMOSYNTHESIS BI: CPT

## 2024-01-19 PROCEDURE — 76641 ULTRASOUND BREAST COMPLETE: CPT

## 2024-01-19 PROCEDURE — 77067 SCR MAMMO BI INCL CAD: CPT

## 2024-01-26 ENCOUNTER — TRANSCRIPTION ENCOUNTER (OUTPATIENT)
Age: 59
End: 2024-01-26

## 2024-03-05 ENCOUNTER — RX RENEWAL (OUTPATIENT)
Age: 59
End: 2024-03-05

## 2024-03-27 ENCOUNTER — APPOINTMENT (OUTPATIENT)
Dept: GASTROENTEROLOGY | Facility: CLINIC | Age: 59
End: 2024-03-27
Payer: COMMERCIAL

## 2024-03-27 VITALS
BODY MASS INDEX: 22.98 KG/M2 | TEMPERATURE: 96.9 F | HEART RATE: 71 BPM | OXYGEN SATURATION: 99 % | HEIGHT: 66 IN | SYSTOLIC BLOOD PRESSURE: 126 MMHG | DIASTOLIC BLOOD PRESSURE: 70 MMHG | WEIGHT: 143 LBS

## 2024-03-27 DIAGNOSIS — M32.9 SYSTEMIC LUPUS ERYTHEMATOSUS, UNSPECIFIED: ICD-10-CM

## 2024-03-27 DIAGNOSIS — Z86.010 PERSONAL HISTORY OF COLONIC POLYPS: ICD-10-CM

## 2024-03-27 DIAGNOSIS — K64.4 RESIDUAL HEMORRHOIDAL SKIN TAGS: ICD-10-CM

## 2024-03-27 DIAGNOSIS — K64.8 OTHER HEMORRHOIDS: ICD-10-CM

## 2024-03-27 PROCEDURE — 99203 OFFICE O/P NEW LOW 30 MIN: CPT

## 2024-03-27 RX ORDER — SODIUM PICOSULFATE, MAGNESIUM OXIDE, AND ANHYDROUS CITRIC ACID 12; 3.5; 1 G/175ML; G/175ML; MG/175ML
10-3.5-12 MG-GM LIQUID ORAL
Qty: 2 | Refills: 0 | Status: ACTIVE | COMMUNITY
Start: 2024-03-27 | End: 1900-01-01

## 2024-03-27 RX ORDER — LOSARTAN POTASSIUM 50 MG/1
50 TABLET, FILM COATED ORAL
Refills: 0 | Status: ACTIVE | COMMUNITY

## 2024-03-27 RX ORDER — LOSARTAN POTASSIUM 25 MG/1
25 TABLET, FILM COATED ORAL DAILY
Qty: 90 | Refills: 1 | Status: DISCONTINUED | COMMUNITY
Start: 2023-09-16 | End: 2024-03-27

## 2024-03-27 NOTE — ASSESSMENT
Pt is a  32 year old  at 32w6d by LMP c/w 6 wk US here for OBV    Patient without complaints. Denies ctx/VB/LOF. +FM     1. Supervision of pregnancy  Date by LMP c/w 6 wk US  Cont PNV  PNL: O+, 13.5/40.1/298, RI, VI, HepB/HIV/RPR/GC/Chlam neg, Pap LSIL/HPV + on 17  Genetic screen: FTS wnl. AFP wnl Cystic fibrosis negative, hemoglobin electrophoresis negative  18 Growth scan: 28.2wga Ceph MVP 4.4 placenta post AC 54% EFW 51%   28wk labs: 11.8/33.7/330 GTT: 83  Tdap: 3/13/18  Male infant.  Breast feeding. Desires circ  PPBC:   Peds:   GBS:  Influenza vaccine: Declines    2. History of gestational hypertension in her last pregnancy  -No preeclampsia or severe features  -17 Baseline PIH labs: H/H/plt 11.6/34.6/236, AST/ALT /, Crea 0.54, LDH 3.5, uric acid 3.4. Pr/Cre 142    3. Headaches in pregnancy  -Continue with conservative measures and Tylenol    4. Abnormal Pap smear: LSIL/HPV positive   -17: LSIL/HPV+  -10/24/17 Colposcopy- impression:  HPV changes  -Colpo to be done 6wk PP    5. Obesity  -Early 1hr GTT 92  -Growth US starting at 32wga     6. RTC 2 weeks for OB visit   [FreeTextEntry1] : Patient with a history of adenomatous colonic polyps.  A colonoscopy has been scheduled. The risks, benefits, alternatives, and limitations of the procedure, including the possibility of missed lesions, were explained.   Plan from 12/21/2020 - Patient with history of adenomatous colonic polyps.  She had one episode of mild rectal bleeding relieved by hemorrhoidal treatments.  A colonoscopy has been scheduled. The risks, benefits, alternatives, and limitations of the procedure, including the possibility of missed lesions, were explained.   Plan from 10/31/2019 - Patient with rectal bleeding along with rectal pain, burning, and itching.  She is currently guaiac negative and has small external hemorrhoids.  It appears that her symptoms are recurring due to her internal hemorrhoids.  She does have a history of colonic polyps.  Patient was given hydrocortisone suppositories 25 mg to take twice daily for 7 days and then as needed.  Patient will return to see me if she has continued rectal bleeding, in which case we would do a colonoscopy now.  Otherwise, the patient is due for a colonoscopy in December 2020.   Plan from 5/29/2018 - Patient with 2 days of self-limited diarrhea with blood one week ago. The bleeding has resolved. The patient is currently guaiac-negative. She has hemorrhoids which likely caused the bleeding.  The patient was given Proctosol HC to Naprosyn cream to use as needed for her hemorrhoids.  The patient was reassured. She was advised to call if any further bleeding develops.  Patient is due for colonoscopy in December 2020 for a history of polyps.   Plan from 12/21/17 - Patient with an adenomatous polyp. She has hemorrhoids which are asymptomatic. She has had no further rectal bleeding.  We will repeat a colonoscopy in 3 years this December 2020.  Advised patient to increase fiber and water in her diet to prevent symptomatic hemorrhoids.   Plan from 11/28/17 - Patient had one-week of rectal bleeding. This may have been related to her external hemorrhoid but other etiologies need to be considered. Additionally, the patient requires an initial screening colonoscopy. The patient has a history of gross hematuria and admits to easy bruising. A bleeding disorder needs to be considered.  Blood work was sent for CBC, PT, PTT.  Assuming the above labs are normal, a colonoscopy has been scheduled. The risks, benefits, alternatives, and limitations of the procedure, including the possibility of missed lesions, were explained.

## 2024-03-27 NOTE — HISTORY OF PRESENT ILLNESS
[FreeTextEntry1] : The patient is a 58-year-old woman with a history of adenomatous colonic polyps.  She last had colonoscopy on January 15, 2021 which was normal other than internal and external hemorrhoids, which have given the patient problems in the past but are currently asymptomatic.  The patient lives in [place] but comes up for medical care.  She denies abdominal pain, heartburn, dysphagia, nausea, vomiting.  She reports 1-3 solid bowel movements a day without melena or bright red blood per rectum.  The patient's weight is stable.  She was admitted in July for low blood pressure and her antihypertensive medication was adjusted.  Otherwise, the patient has not been admitted to the hospital in the past year and denies any cardiac issues.   Note from 12/21/2020 - The patient has a history of adenomatous colonic polyps.  She has been feeling well denying abdominal pain.  She has 1 solid bowel movement a day without melena.  She did see bright red blood on the toilet paper in October.  She used the suppositories and cream for 1 week and the bleeding resolved.  She denies heartburn or dysphagia.  The patient's weight is stable.  The patient has not been admitted to the hospital in the past year and denies any cardiac issues.   Note from 10/31/2019 - The patient reports that 3 weeks ago she held her stool in which made her bowel movements hard and she developed burning/itching at the rectum.  She used an over-the-counter hemorrhoidal suppository and did warm soaks with some relief on Sunday morning while in the shower the patient started having blood coming from her rectum dripping down her leg.  She went to the [facility] emergency room where she was guaiac negative and sent home.  She notes that she had increased frequency of bowel movements but these have since slowed down.  She is now having 2 solid bowel movements a day without melena.  She denies abdominal pain, heartburn, dysphasia.  She continues to complain of rectal pain, burning, and itching, mostly internally.  She has known internal and external hemorrhoids.   Note from 5/29/2018 - The patient states that she had a sinus infection with a runny nose. She took prednisone for 2 days and then developed diarrhea. She had 2 days of multiple liquid bowel movements with blood in it, which occurred one week ago. Since then, the patient has had one solid bowel movement a day without any further bleeding. There is no melena. She denies abdominal pain until today which she feels is related to eating spoiled food last evening. She denies rectal pain, burning, or itching. Her last colonoscopy was in December and revealed an adenomatous polyp and internal and external hemorrhoids.   Note from 12/21/17 - The patient is status post colonoscopy performed on December 5, 2017. A 1.5 cm polyp was removed from the proximal transverse colon and was a tubular adenoma. The patient also has internal and external hemorrhoids which are asymptomatic. The patient feels well denying abdominal pain, diarrhea, constipation, melena, and bright red blood per rectum. We reviewed the blood work from the previous visit of November 28 which was normal.   Note from 11/28/17 - The patient is a 52-year-old woman presents with rectal bleeding. 4 weeks ago, the patient had congestion and coughing. For a week, she had soft stools with blood mixed with brown stool. The water turned red. She denies melena. She has had no further bleeding and now has one solid bowel movement a day. She denies abdominal pain, heartburn, or dysphagia. She denies rectal pain, burning, or itching. Her weight is stable. She has never had a colonoscopy. The patient has not been hospitalized in the past year and denies any cardiac issues.  Of note, the patient has a history of gross hematuria which was unexplained. She also admits to easy bruising.

## 2024-03-27 NOTE — CONSULT LETTER
[FreeTextEntry1] : Dear ANNEL Coronado and ANNEL Umana ([place]),  I had the pleasure of seeing your patient JU MARTINEZ in the office today.  My office note is attached. PLEASE READ THE "ASSESSMENT" SECTION OF THE NOTE TO SEE MY IMPRESSION AND PLAN.  Thank you very much for allowing me to participate in the care of your patient.  Sincerely,  Adán Flores M.D., FACG, FACP Director, Celiac Program at [facility]  of Medicine, [facility] at [facility] [place]/[facility] Adjunct  of Medicine, [facility] Practice Director, [facility] - Gastroenterology at [address] - [address] [place] [address] Tel: [phone] Email: [email or web address]   The attached note has been created using a voice recognition system (Dragon).  There may be some misspellings and typos.  Please call my office if you have any issues or questions.

## 2024-04-02 ENCOUNTER — APPOINTMENT (OUTPATIENT)
Dept: GASTROENTEROLOGY | Facility: AMBULATORY MEDICAL SERVICES | Age: 59
End: 2024-04-02
Payer: COMMERCIAL

## 2024-04-02 PROCEDURE — 45378 DIAGNOSTIC COLONOSCOPY: CPT | Mod: PT

## 2024-06-25 ENCOUNTER — APPOINTMENT (OUTPATIENT)
Dept: INTERNAL MEDICINE | Facility: CLINIC | Age: 59
End: 2024-06-25
Payer: COMMERCIAL

## 2024-06-25 VITALS
OXYGEN SATURATION: 100 % | HEART RATE: 77 BPM | TEMPERATURE: 98.1 F | SYSTOLIC BLOOD PRESSURE: 181 MMHG | WEIGHT: 140 LBS | RESPIRATION RATE: 12 BRPM | DIASTOLIC BLOOD PRESSURE: 98 MMHG | BODY MASS INDEX: 22.5 KG/M2 | HEIGHT: 66 IN

## 2024-06-25 DIAGNOSIS — I10 ESSENTIAL (PRIMARY) HYPERTENSION: ICD-10-CM

## 2024-06-25 DIAGNOSIS — E55.9 VITAMIN D DEFICIENCY, UNSPECIFIED: ICD-10-CM

## 2024-06-25 DIAGNOSIS — Z00.00 ENCOUNTER FOR GENERAL ADULT MEDICAL EXAMINATION W/OUT ABNORMAL FINDINGS: ICD-10-CM

## 2024-06-25 DIAGNOSIS — R73.09 OTHER ABNORMAL GLUCOSE: ICD-10-CM

## 2024-06-25 DIAGNOSIS — Z11.52 ENCOUNTER FOR SCREENING FOR COVID-19: ICD-10-CM

## 2024-06-25 DIAGNOSIS — M32.9 SYSTEMIC LUPUS ERYTHEMATOSUS, UNSPECIFIED: ICD-10-CM

## 2024-06-25 PROCEDURE — 36415 COLL VENOUS BLD VENIPUNCTURE: CPT

## 2024-06-25 PROCEDURE — 99396 PREV VISIT EST AGE 40-64: CPT | Mod: 25

## 2024-06-26 LAB
25(OH)D3 SERPL-MCNC: 32.1 NG/ML
ALBUMIN SERPL ELPH-MCNC: 4.8 G/DL
ALP BLD-CCNC: 80 U/L
ALT SERPL-CCNC: 18 U/L
ANION GAP SERPL CALC-SCNC: 13 MMOL/L
APPEARANCE: CLEAR
AST SERPL-CCNC: 22 U/L
BACTERIA: NEGATIVE /HPF
BASOPHILS # BLD AUTO: 0.03 K/UL
BASOPHILS NFR BLD AUTO: 0.9 %
BILIRUB SERPL-MCNC: 0.9 MG/DL
BILIRUBIN URINE: NEGATIVE
BLOOD URINE: ABNORMAL
BUN SERPL-MCNC: 13 MG/DL
CALCIUM SERPL-MCNC: 10 MG/DL
CAST: 0 /LPF
CHLORIDE SERPL-SCNC: 103 MMOL/L
CHOLEST SERPL-MCNC: 181 MG/DL
CO2 SERPL-SCNC: 25 MMOL/L
COLOR: YELLOW
CREAT SERPL-MCNC: 0.84 MG/DL
EGFR: 80 ML/MIN/1.73M2
EOSINOPHIL # BLD AUTO: 0.04 K/UL
EOSINOPHIL NFR BLD AUTO: 1.2 %
EPITHELIAL CELLS: 0 /HPF
ESTIMATED AVERAGE GLUCOSE: 111 MG/DL
GLUCOSE QUALITATIVE U: NEGATIVE MG/DL
GLUCOSE SERPL-MCNC: 82 MG/DL
HBA1C MFR BLD HPLC: 5.5 %
HCT VFR BLD CALC: 46.2 %
HDLC SERPL-MCNC: 96 MG/DL
HGB BLD-MCNC: 14.9 G/DL
IMM GRANULOCYTES NFR BLD AUTO: 0 %
KETONES URINE: 15 MG/DL
LDLC SERPL CALC-MCNC: 76 MG/DL
LEUKOCYTE ESTERASE URINE: NEGATIVE
LYMPHOCYTES # BLD AUTO: 1.37 K/UL
LYMPHOCYTES NFR BLD AUTO: 41.9 %
MAN DIFF?: NORMAL
MCHC RBC-ENTMCNC: 30 PG
MCHC RBC-ENTMCNC: 32.3 GM/DL
MCV RBC AUTO: 93 FL
MICROSCOPIC-UA: NORMAL
MONOCYTES # BLD AUTO: 0.29 K/UL
MONOCYTES NFR BLD AUTO: 8.9 %
NEUTROPHILS # BLD AUTO: 1.54 K/UL
NEUTROPHILS NFR BLD AUTO: 47.1 %
NITRITE URINE: NEGATIVE
NONHDLC SERPL-MCNC: 85 MG/DL
PH URINE: 7
PLATELET # BLD AUTO: 144 K/UL
POTASSIUM SERPL-SCNC: 4.3 MMOL/L
PROT SERPL-MCNC: 7.9 G/DL
PROTEIN URINE: NORMAL MG/DL
RBC # BLD: 4.97 M/UL
RBC # FLD: 13.1 %
RED BLOOD CELLS URINE: 5 /HPF
SODIUM SERPL-SCNC: 141 MMOL/L
SPECIFIC GRAVITY URINE: 1.02
T4 FREE SERPL-MCNC: 1.4 NG/DL
TRIGL SERPL-MCNC: 47 MG/DL
TSH SERPL-ACNC: 1.65 UIU/ML
UROBILINOGEN URINE: 0.2 MG/DL
VIT B12 SERPL-MCNC: 782 PG/ML
WBC # FLD AUTO: 3.27 K/UL
WHITE BLOOD CELLS URINE: 0 /HPF

## 2024-07-03 ENCOUNTER — TRANSCRIPTION ENCOUNTER (OUTPATIENT)
Age: 59
End: 2024-07-03

## 2024-07-03 DIAGNOSIS — D72.819 DECREASED WHITE BLOOD CELL COUNT, UNSPECIFIED: ICD-10-CM

## 2024-07-03 DIAGNOSIS — R31.29 OTHER MICROSCOPIC HEMATURIA: ICD-10-CM

## 2024-07-03 DIAGNOSIS — D69.6 THROMBOCYTOPENIA, UNSPECIFIED: ICD-10-CM

## 2024-07-09 ENCOUNTER — TRANSCRIPTION ENCOUNTER (OUTPATIENT)
Age: 59
End: 2024-07-09

## 2024-09-18 ENCOUNTER — NON-APPOINTMENT (OUTPATIENT)
Age: 59
End: 2024-09-18

## 2024-09-18 ENCOUNTER — APPOINTMENT (OUTPATIENT)
Dept: OTOLARYNGOLOGY | Facility: CLINIC | Age: 59
End: 2024-09-18
Payer: COMMERCIAL

## 2024-09-18 VITALS
WEIGHT: 144 LBS | BODY MASS INDEX: 23.14 KG/M2 | HEART RATE: 71 BPM | DIASTOLIC BLOOD PRESSURE: 94 MMHG | HEIGHT: 66 IN | SYSTOLIC BLOOD PRESSURE: 137 MMHG

## 2024-09-18 DIAGNOSIS — H61.20 IMPACTED CERUMEN, UNSPECIFIED EAR: ICD-10-CM

## 2024-09-18 DIAGNOSIS — H93.13 TINNITUS, BILATERAL: ICD-10-CM

## 2024-09-18 PROCEDURE — 99204 OFFICE O/P NEW MOD 45 MIN: CPT | Mod: 25

## 2024-09-18 PROCEDURE — 69210 REMOVE IMPACTED EAR WAX UNI: CPT

## 2024-09-18 NOTE — REVIEW OF SYSTEMS
[Sneezing] : sneezing [Seasonal Allergies] : seasonal allergies [Post Nasal Drip] : post nasal drip [Nasal Congestion] : nasal congestion [Nose Bleeds] : nose bleeds [Sinus Pressure] : sinus pressure [Throat Clearing] : throat clearing [Eyes Itch] : itching of the eyes [Cough] : cough [Joint Pain] : joint pain [Negative] : Heme/Lymph [Patient Intake Form Reviewed] : Patient intake form was reviewed [de-identified] : Nosebleed mostly Lt side  [FreeTextEntry9] : Muscle aches  [de-identified] : Feel cooler than others

## 2024-09-18 NOTE — PHYSICAL EXAM
[de-identified] : RIGHT IMPACTED CERUMEN REMOVED [Normal] : mucosa is normal [Midline] : trachea located in midline position

## 2024-09-18 NOTE — REASON FOR VISIT
[Initial Consultation] : an initial consultation for [FreeTextEntry2] : Tinnitus after first Covid shot, April 1, 2021

## 2024-09-18 NOTE — ASSESSMENT
[FreeTextEntry1] : MRI PREVIOUSLY DONE REVIEWED ABNORMAL AICA HTN AND CORRELATION WITH INTERMITINT PULSATING TINNITUS ESPECIALLY WITH AICA ABNORMAL LOOPS EXPLAINED/ DISCUSSED SOUND MASKING AVOID Q TIPS CONSERVATIVE MANAGEMENT DISCUSSED F/U 2 MONTHS F/U PMD

## 2025-02-26 ENCOUNTER — RESULT REVIEW (OUTPATIENT)
Age: 60
End: 2025-02-26

## 2025-02-26 ENCOUNTER — OUTPATIENT (OUTPATIENT)
Dept: OUTPATIENT SERVICES | Facility: HOSPITAL | Age: 60
LOS: 1 days | End: 2025-02-26
Payer: COMMERCIAL

## 2025-02-26 ENCOUNTER — APPOINTMENT (OUTPATIENT)
Dept: MAMMOGRAPHY | Facility: CLINIC | Age: 60
End: 2025-02-26
Payer: COMMERCIAL

## 2025-02-26 ENCOUNTER — APPOINTMENT (OUTPATIENT)
Dept: ULTRASOUND IMAGING | Facility: CLINIC | Age: 60
End: 2025-02-26
Payer: COMMERCIAL

## 2025-02-26 DIAGNOSIS — Z00.8 ENCOUNTER FOR OTHER GENERAL EXAMINATION: ICD-10-CM

## 2025-02-26 PROCEDURE — 77067 SCR MAMMO BI INCL CAD: CPT | Mod: 26

## 2025-02-26 PROCEDURE — 76641 ULTRASOUND BREAST COMPLETE: CPT

## 2025-02-26 PROCEDURE — 76641 ULTRASOUND BREAST COMPLETE: CPT | Mod: 26,50

## 2025-02-26 PROCEDURE — 77063 BREAST TOMOSYNTHESIS BI: CPT | Mod: 26

## 2025-02-26 PROCEDURE — 77067 SCR MAMMO BI INCL CAD: CPT

## 2025-02-26 PROCEDURE — 77063 BREAST TOMOSYNTHESIS BI: CPT

## 2025-05-01 ENCOUNTER — APPOINTMENT (OUTPATIENT)
Dept: INTERNAL MEDICINE | Facility: CLINIC | Age: 60
End: 2025-05-01
Payer: COMMERCIAL

## 2025-05-01 VITALS
DIASTOLIC BLOOD PRESSURE: 90 MMHG | BODY MASS INDEX: 23.95 KG/M2 | WEIGHT: 149 LBS | SYSTOLIC BLOOD PRESSURE: 130 MMHG | OXYGEN SATURATION: 96 % | HEART RATE: 79 BPM | HEIGHT: 66 IN | TEMPERATURE: 98.8 F

## 2025-05-01 DIAGNOSIS — U07.1 COVID-19: ICD-10-CM

## 2025-05-01 DIAGNOSIS — R09.89 OTHER SPECIFIED SYMPTOMS AND SIGNS INVOLVING THE CIRCULATORY AND RESPIRATORY SYSTEMS: ICD-10-CM

## 2025-05-01 DIAGNOSIS — R05.9 COUGH, UNSPECIFIED: ICD-10-CM

## 2025-05-01 LAB
FLUAV SPEC QL CULT: NEGATIVE
FLUBV AG SPEC QL IA: NEGATIVE
SARS-COV-2 RDRP RESP QL NAA+PROBE: POSITIVE

## 2025-05-01 PROCEDURE — 87635 SARS-COV-2 COVID-19 AMP PRB: CPT | Mod: QW

## 2025-05-01 PROCEDURE — 99213 OFFICE O/P EST LOW 20 MIN: CPT

## 2025-05-01 PROCEDURE — 87804 INFLUENZA ASSAY W/OPTIC: CPT | Mod: QW

## 2025-05-07 ENCOUNTER — APPOINTMENT (OUTPATIENT)
Dept: OTOLARYNGOLOGY | Facility: CLINIC | Age: 60
End: 2025-05-07
Payer: COMMERCIAL

## 2025-05-07 ENCOUNTER — NON-APPOINTMENT (OUTPATIENT)
Age: 60
End: 2025-05-07

## 2025-05-07 VITALS
DIASTOLIC BLOOD PRESSURE: 80 MMHG | WEIGHT: 151 LBS | SYSTOLIC BLOOD PRESSURE: 155 MMHG | HEIGHT: 66 IN | BODY MASS INDEX: 24.27 KG/M2 | HEART RATE: 73 BPM

## 2025-05-07 DIAGNOSIS — M26.609 UNSPECIFIED TEMPOROMANDIBULAR JOINT DISORDER: ICD-10-CM

## 2025-05-07 DIAGNOSIS — H93.13 TINNITUS, BILATERAL: ICD-10-CM

## 2025-05-07 DIAGNOSIS — H69.90 UNSPECIFIED EUSTACHIAN TUBE DISORDER, UNSPECIFIED EAR: ICD-10-CM

## 2025-05-07 PROCEDURE — 99213 OFFICE O/P EST LOW 20 MIN: CPT

## 2025-05-07 PROCEDURE — 92557 COMPREHENSIVE HEARING TEST: CPT

## 2025-05-07 PROCEDURE — 92567 TYMPANOMETRY: CPT

## 2025-05-07 RX ORDER — TOBRAMYCIN AND DEXAMETHASONE 3; 1 MG/ML; MG/ML
0.3-0.1 SUSPENSION/ DROPS OPHTHALMIC
Refills: 0 | Status: ACTIVE | COMMUNITY

## 2025-05-14 ENCOUNTER — APPOINTMENT (OUTPATIENT)
Dept: INTERNAL MEDICINE | Facility: CLINIC | Age: 60
End: 2025-05-14

## 2025-05-27 ENCOUNTER — APPOINTMENT (OUTPATIENT)
Dept: INTERNAL MEDICINE | Facility: CLINIC | Age: 60
End: 2025-05-27
Payer: COMMERCIAL

## 2025-05-27 VITALS
DIASTOLIC BLOOD PRESSURE: 88 MMHG | HEIGHT: 66 IN | TEMPERATURE: 98.8 F | BODY MASS INDEX: 23.63 KG/M2 | WEIGHT: 147 LBS | HEART RATE: 74 BPM | SYSTOLIC BLOOD PRESSURE: 162 MMHG | RESPIRATION RATE: 16 BRPM | OXYGEN SATURATION: 96 %

## 2025-05-27 DIAGNOSIS — J06.9 ACUTE UPPER RESPIRATORY INFECTION, UNSPECIFIED: ICD-10-CM

## 2025-05-27 DIAGNOSIS — J01.00 ACUTE MAXILLARY SINUSITIS, UNSPECIFIED: ICD-10-CM

## 2025-05-27 PROCEDURE — 99213 OFFICE O/P EST LOW 20 MIN: CPT

## 2025-05-27 PROCEDURE — 99203 OFFICE O/P NEW LOW 30 MIN: CPT

## 2025-05-27 RX ORDER — PREDNISONE 10 MG/1
10 TABLET ORAL
Qty: 19 | Refills: 0 | Status: ACTIVE | COMMUNITY
Start: 2025-05-27 | End: 1900-01-01

## 2025-05-27 RX ORDER — CLARITHROMYCIN 500 MG/1
500 TABLET, FILM COATED, EXTENDED RELEASE ORAL DAILY
Qty: 20 | Refills: 0 | Status: ACTIVE | COMMUNITY
Start: 2025-05-27 | End: 1900-01-01

## 2025-06-26 ENCOUNTER — APPOINTMENT (OUTPATIENT)
Dept: INTERNAL MEDICINE | Facility: CLINIC | Age: 60
End: 2025-06-26
Payer: COMMERCIAL

## 2025-06-26 VITALS
RESPIRATION RATE: 16 BRPM | BODY MASS INDEX: 23.63 KG/M2 | HEART RATE: 71 BPM | OXYGEN SATURATION: 98 % | SYSTOLIC BLOOD PRESSURE: 144 MMHG | TEMPERATURE: 98.8 F | DIASTOLIC BLOOD PRESSURE: 90 MMHG | HEIGHT: 66 IN | WEIGHT: 147 LBS

## 2025-06-26 PROBLEM — M54.2 NECK PAIN: Status: ACTIVE | Noted: 2025-06-26

## 2025-06-26 PROBLEM — Z87.39 HISTORY OF MUSCLE SPASM: Status: RESOLVED | Noted: 2025-06-26 | Resolved: 2025-06-26

## 2025-06-26 PROBLEM — Z87.39 HISTORY OF MUSCLE SPASM: Status: RESOLVED | Noted: 2022-05-10 | Resolved: 2025-06-26

## 2025-06-26 PROBLEM — M62.838 MUSCLE SPASM: Status: ACTIVE | Noted: 2025-06-26

## 2025-06-26 PROCEDURE — 99396 PREV VISIT EST AGE 40-64: CPT

## 2025-06-26 PROCEDURE — 36415 COLL VENOUS BLD VENIPUNCTURE: CPT

## 2025-06-27 LAB
25(OH)D3 SERPL-MCNC: 24.9 NG/ML
ALBUMIN SERPL ELPH-MCNC: 4.4 G/DL
ALP BLD-CCNC: 85 U/L
ALT SERPL-CCNC: 33 U/L
ANION GAP SERPL CALC-SCNC: 14 MMOL/L
APPEARANCE: CLEAR
AST SERPL-CCNC: 32 U/L
BACTERIA: NEGATIVE /HPF
BASOPHILS # BLD AUTO: 0.03 K/UL
BASOPHILS NFR BLD AUTO: 0.9 %
BILIRUB SERPL-MCNC: 0.8 MG/DL
BILIRUBIN URINE: NEGATIVE
BLOOD URINE: NEGATIVE
BUN SERPL-MCNC: 12 MG/DL
CALCIUM SERPL-MCNC: 9.9 MG/DL
CAST: 0 /LPF
CHLORIDE SERPL-SCNC: 107 MMOL/L
CHOLEST SERPL-MCNC: 178 MG/DL
CO2 SERPL-SCNC: 23 MMOL/L
COLOR: YELLOW
CREAT SERPL-MCNC: 0.85 MG/DL
EGFRCR SERPLBLD CKD-EPI 2021: 78 ML/MIN/1.73M2
EOSINOPHIL # BLD AUTO: 0.06 K/UL
EOSINOPHIL NFR BLD AUTO: 1.9 %
EPITHELIAL CELLS: 0 /HPF
ESTIMATED AVERAGE GLUCOSE: 120 MG/DL
GLUCOSE QUALITATIVE U: NEGATIVE MG/DL
GLUCOSE SERPL-MCNC: 87 MG/DL
HBA1C MFR BLD HPLC: 5.8 %
HCT VFR BLD CALC: 43.9 %
HDLC SERPL-MCNC: 96 MG/DL
HGB BLD-MCNC: 14.2 G/DL
IMM GRANULOCYTES NFR BLD AUTO: 0.3 %
KETONES URINE: ABNORMAL MG/DL
LDLC SERPL-MCNC: 75 MG/DL
LEUKOCYTE ESTERASE URINE: NEGATIVE
LYMPHOCYTES # BLD AUTO: 1.27 K/UL
LYMPHOCYTES NFR BLD AUTO: 39.2 %
MAGNESIUM SERPL-MCNC: 2.2 MG/DL
MAN DIFF?: NORMAL
MCHC RBC-ENTMCNC: 29.8 PG
MCHC RBC-ENTMCNC: 32.3 G/DL
MCV RBC AUTO: 92.2 FL
MICROSCOPIC-UA: NORMAL
MONOCYTES # BLD AUTO: 0.34 K/UL
MONOCYTES NFR BLD AUTO: 10.5 %
NEUTROPHILS # BLD AUTO: 1.53 K/UL
NEUTROPHILS NFR BLD AUTO: 47.2 %
NITRITE URINE: NEGATIVE
NONHDLC SERPL-MCNC: 83 MG/DL
PH URINE: 7.5
PLATELET # BLD AUTO: 128 K/UL
POTASSIUM SERPL-SCNC: 3.9 MMOL/L
PROT SERPL-MCNC: 7.3 G/DL
PROTEIN URINE: NEGATIVE MG/DL
RBC # BLD: 4.76 M/UL
RBC # FLD: 13.5 %
RED BLOOD CELLS URINE: 5 /HPF
REVIEW: NORMAL
SODIUM SERPL-SCNC: 144 MMOL/L
SPECIFIC GRAVITY URINE: 1.02
T4 FREE SERPL-MCNC: 1.2 NG/DL
TRIGL SERPL-MCNC: 34 MG/DL
TSH SERPL-ACNC: 1.31 UIU/ML
UROBILINOGEN URINE: 0.2 MG/DL
VIT B12 SERPL-MCNC: 737 PG/ML
WBC # FLD AUTO: 3.24 K/UL
WHITE BLOOD CELLS URINE: 0 /HPF

## 2025-07-08 ENCOUNTER — TRANSCRIPTION ENCOUNTER (OUTPATIENT)
Age: 60
End: 2025-07-08

## 2025-08-06 ENCOUNTER — APPOINTMENT (OUTPATIENT)
Dept: PULMONOLOGY | Facility: CLINIC | Age: 60
End: 2025-08-06
Payer: COMMERCIAL

## 2025-08-06 VITALS — OXYGEN SATURATION: 98 % | RESPIRATION RATE: 16 BRPM | HEART RATE: 81 BPM

## 2025-08-06 VITALS — SYSTOLIC BLOOD PRESSURE: 140 MMHG | DIASTOLIC BLOOD PRESSURE: 90 MMHG

## 2025-08-06 VITALS — WEIGHT: 145 LBS | HEIGHT: 66 IN | BODY MASS INDEX: 23.3 KG/M2

## 2025-08-06 DIAGNOSIS — J20.9 ACUTE BRONCHITIS, UNSPECIFIED: ICD-10-CM

## 2025-08-06 PROCEDURE — 99204 OFFICE O/P NEW MOD 45 MIN: CPT
